# Patient Record
Sex: FEMALE | Race: BLACK OR AFRICAN AMERICAN | NOT HISPANIC OR LATINO
[De-identification: names, ages, dates, MRNs, and addresses within clinical notes are randomized per-mention and may not be internally consistent; named-entity substitution may affect disease eponyms.]

---

## 2019-06-04 ENCOUNTER — RESULT REVIEW (OUTPATIENT)
Age: 54
End: 2019-06-04

## 2019-08-22 ENCOUNTER — RESULT REVIEW (OUTPATIENT)
Age: 54
End: 2019-08-22

## 2020-01-15 ENCOUNTER — INPATIENT (INPATIENT)
Facility: HOSPITAL | Age: 55
LOS: 9 days | Discharge: HOME | End: 2020-01-25
Attending: INTERNAL MEDICINE | Admitting: INTERNAL MEDICINE
Payer: COMMERCIAL

## 2020-01-15 VITALS
OXYGEN SATURATION: 99 % | SYSTOLIC BLOOD PRESSURE: 143 MMHG | TEMPERATURE: 98 F | DIASTOLIC BLOOD PRESSURE: 83 MMHG | HEART RATE: 86 BPM | RESPIRATION RATE: 18 BRPM

## 2020-01-15 DIAGNOSIS — Z98.891 HISTORY OF UTERINE SCAR FROM PREVIOUS SURGERY: Chronic | ICD-10-CM

## 2020-01-15 DIAGNOSIS — R00.2 PALPITATIONS: ICD-10-CM

## 2020-01-15 DIAGNOSIS — I10 ESSENTIAL (PRIMARY) HYPERTENSION: ICD-10-CM

## 2020-01-15 DIAGNOSIS — Z90.3 ACQUIRED ABSENCE OF STOMACH [PART OF]: Chronic | ICD-10-CM

## 2020-01-15 DIAGNOSIS — I46.9 CARDIAC ARREST, CAUSE UNSPECIFIED: ICD-10-CM

## 2020-01-15 DIAGNOSIS — E11.9 TYPE 2 DIABETES MELLITUS WITHOUT COMPLICATIONS: ICD-10-CM

## 2020-01-15 DIAGNOSIS — Z98.51 TUBAL LIGATION STATUS: Chronic | ICD-10-CM

## 2020-01-15 LAB
ALBUMIN SERPL ELPH-MCNC: 4.6 G/DL — SIGNIFICANT CHANGE UP (ref 3.3–5)
ALP SERPL-CCNC: 85 U/L — SIGNIFICANT CHANGE UP (ref 40–120)
ALT FLD-CCNC: 7 U/L — SIGNIFICANT CHANGE UP (ref 4–33)
ANION GAP SERPL CALC-SCNC: 13 MMO/L — SIGNIFICANT CHANGE UP (ref 7–14)
AST SERPL-CCNC: 9 U/L — SIGNIFICANT CHANGE UP (ref 4–32)
BASOPHILS # BLD AUTO: 0.04 K/UL — SIGNIFICANT CHANGE UP (ref 0–0.2)
BASOPHILS NFR BLD AUTO: 0.7 % — SIGNIFICANT CHANGE UP (ref 0–2)
BILIRUB SERPL-MCNC: < 0.2 MG/DL — LOW (ref 0.2–1.2)
BUN SERPL-MCNC: 15 MG/DL — SIGNIFICANT CHANGE UP (ref 7–23)
CALCIUM SERPL-MCNC: 9.9 MG/DL — SIGNIFICANT CHANGE UP (ref 8.4–10.5)
CHLORIDE SERPL-SCNC: 100 MMOL/L — SIGNIFICANT CHANGE UP (ref 98–107)
CK MB BLD-MCNC: 1.59 NG/ML — SIGNIFICANT CHANGE UP (ref 1–4.7)
CK MB BLD-MCNC: SIGNIFICANT CHANGE UP (ref 0–2.5)
CK SERPL-CCNC: 78 U/L — SIGNIFICANT CHANGE UP (ref 25–170)
CO2 SERPL-SCNC: 23 MMOL/L — SIGNIFICANT CHANGE UP (ref 22–31)
CREAT SERPL-MCNC: 0.72 MG/DL — SIGNIFICANT CHANGE UP (ref 0.5–1.3)
EOSINOPHIL # BLD AUTO: 0.04 K/UL — SIGNIFICANT CHANGE UP (ref 0–0.5)
EOSINOPHIL NFR BLD AUTO: 0.7 % — SIGNIFICANT CHANGE UP (ref 0–6)
GLUCOSE BLDC GLUCOMTR-MCNC: 172 MG/DL — HIGH (ref 70–99)
GLUCOSE BLDC GLUCOMTR-MCNC: 80 MG/DL — SIGNIFICANT CHANGE UP (ref 70–99)
GLUCOSE SERPL-MCNC: 91 MG/DL — SIGNIFICANT CHANGE UP (ref 70–99)
HCT VFR BLD CALC: 36 % — SIGNIFICANT CHANGE UP (ref 34.5–45)
HGB BLD-MCNC: 10.6 G/DL — LOW (ref 11.5–15.5)
IMM GRANULOCYTES NFR BLD AUTO: 0.2 % — SIGNIFICANT CHANGE UP (ref 0–1.5)
LYMPHOCYTES # BLD AUTO: 1.59 K/UL — SIGNIFICANT CHANGE UP (ref 1–3.3)
LYMPHOCYTES # BLD AUTO: 29.6 % — SIGNIFICANT CHANGE UP (ref 13–44)
MAGNESIUM SERPL-MCNC: 2 MG/DL — SIGNIFICANT CHANGE UP (ref 1.6–2.6)
MCHC RBC-ENTMCNC: 21.2 PG — LOW (ref 27–34)
MCHC RBC-ENTMCNC: 29.4 % — LOW (ref 32–36)
MCV RBC AUTO: 72.1 FL — LOW (ref 80–100)
MONOCYTES # BLD AUTO: 0.3 K/UL — SIGNIFICANT CHANGE UP (ref 0–0.9)
MONOCYTES NFR BLD AUTO: 5.6 % — SIGNIFICANT CHANGE UP (ref 2–14)
NEUTROPHILS # BLD AUTO: 3.39 K/UL — SIGNIFICANT CHANGE UP (ref 1.8–7.4)
NEUTROPHILS NFR BLD AUTO: 63.2 % — SIGNIFICANT CHANGE UP (ref 43–77)
NRBC # FLD: 0 K/UL — SIGNIFICANT CHANGE UP (ref 0–0)
PHOSPHATE SERPL-MCNC: 3.3 MG/DL — SIGNIFICANT CHANGE UP (ref 2.5–4.5)
PLATELET # BLD AUTO: 416 K/UL — HIGH (ref 150–400)
PMV BLD: 10.3 FL — SIGNIFICANT CHANGE UP (ref 7–13)
POTASSIUM SERPL-MCNC: 4.5 MMOL/L — SIGNIFICANT CHANGE UP (ref 3.5–5.3)
POTASSIUM SERPL-SCNC: 4.5 MMOL/L — SIGNIFICANT CHANGE UP (ref 3.5–5.3)
PROT SERPL-MCNC: 7.6 G/DL — SIGNIFICANT CHANGE UP (ref 6–8.3)
RBC # BLD: 4.99 M/UL — SIGNIFICANT CHANGE UP (ref 3.8–5.2)
RBC # FLD: 16.8 % — HIGH (ref 10.3–14.5)
SODIUM SERPL-SCNC: 136 MMOL/L — SIGNIFICANT CHANGE UP (ref 135–145)
TROPONIN T, HIGH SENSITIVITY: < 6 NG/L — SIGNIFICANT CHANGE UP (ref ?–14)
TSH SERPL-MCNC: 0.74 UIU/ML — SIGNIFICANT CHANGE UP (ref 0.27–4.2)
WBC # BLD: 5.37 K/UL — SIGNIFICANT CHANGE UP (ref 3.8–10.5)
WBC # FLD AUTO: 5.37 K/UL — SIGNIFICANT CHANGE UP (ref 3.8–10.5)

## 2020-01-15 PROCEDURE — 71046 X-RAY EXAM CHEST 2 VIEWS: CPT | Mod: 26

## 2020-01-15 RX ORDER — HEPARIN SODIUM 5000 [USP'U]/ML
5000 INJECTION INTRAVENOUS; SUBCUTANEOUS EVERY 8 HOURS
Refills: 0 | Status: DISCONTINUED | OUTPATIENT
Start: 2020-01-15 | End: 2020-01-24

## 2020-01-15 RX ORDER — DEXTROSE 50 % IN WATER 50 %
25 SYRINGE (ML) INTRAVENOUS ONCE
Refills: 0 | Status: DISCONTINUED | OUTPATIENT
Start: 2020-01-15 | End: 2020-01-25

## 2020-01-15 RX ORDER — INSULIN LISPRO 100/ML
VIAL (ML) SUBCUTANEOUS
Refills: 0 | Status: DISCONTINUED | OUTPATIENT
Start: 2020-01-15 | End: 2020-01-25

## 2020-01-15 RX ORDER — DEXTROSE 50 % IN WATER 50 %
15 SYRINGE (ML) INTRAVENOUS ONCE
Refills: 0 | Status: DISCONTINUED | OUTPATIENT
Start: 2020-01-15 | End: 2020-01-25

## 2020-01-15 RX ORDER — SODIUM CHLORIDE 9 MG/ML
1000 INJECTION, SOLUTION INTRAVENOUS
Refills: 0 | Status: DISCONTINUED | OUTPATIENT
Start: 2020-01-15 | End: 2020-01-25

## 2020-01-15 RX ORDER — DEXTROSE 50 % IN WATER 50 %
12.5 SYRINGE (ML) INTRAVENOUS ONCE
Refills: 0 | Status: DISCONTINUED | OUTPATIENT
Start: 2020-01-15 | End: 2020-01-25

## 2020-01-15 RX ORDER — LISINOPRIL 2.5 MG/1
5 TABLET ORAL DAILY
Refills: 0 | Status: DISCONTINUED | OUTPATIENT
Start: 2020-01-16 | End: 2020-01-25

## 2020-01-15 RX ORDER — GLUCAGON INJECTION, SOLUTION 0.5 MG/.1ML
1 INJECTION, SOLUTION SUBCUTANEOUS ONCE
Refills: 0 | Status: DISCONTINUED | OUTPATIENT
Start: 2020-01-15 | End: 2020-01-25

## 2020-01-15 RX ORDER — METFORMIN HYDROCHLORIDE 850 MG/1
1 TABLET ORAL
Qty: 0 | Refills: 0 | DISCHARGE

## 2020-01-15 RX ORDER — INFLUENZA VIRUS VACCINE 15; 15; 15; 15 UG/.5ML; UG/.5ML; UG/.5ML; UG/.5ML
0.5 SUSPENSION INTRAMUSCULAR ONCE
Refills: 0 | Status: COMPLETED | OUTPATIENT
Start: 2020-01-15 | End: 2020-01-15

## 2020-01-15 RX ORDER — SAXAGLIPTIN 5 MG/1
1 TABLET, FILM COATED ORAL
Qty: 0 | Refills: 0 | DISCHARGE

## 2020-01-15 RX ORDER — LISINOPRIL 2.5 MG/1
1 TABLET ORAL
Qty: 0 | Refills: 0 | DISCHARGE

## 2020-01-15 RX ADMIN — HEPARIN SODIUM 5000 UNIT(S): 5000 INJECTION INTRAVENOUS; SUBCUTANEOUS at 21:24

## 2020-01-15 NOTE — H&P ADULT - ATTENDING COMMENTS
Patient seen and examined.  Agree with above.   Admitted with symptomatic palpitations  Pt. with recent history of ? cardiac arrest in [place] where she underwent cardiac cath that was reportedly normal and TTE which showed normal LVEF with mild inferior hypokinesis  No chest pain or anginal symptoms  No urgent cath needed at this time  Check TTE  Monitor tele  Obtain prior hospital records   EP bob with Dr. Yazmin Dennis MD

## 2020-01-15 NOTE — ED PROVIDER NOTE - OBJECTIVE STATEMENT
55 y/o female hx DM presents to ER for palpitations. Pt. states earlier while at work today felt lightheaded and experiencing palpitations - states flet like she was going to pass out, states she checked her apple watch and her HR was 185 - states eventually resolved. Of note - 1 week ago patient had cardiac arrest in [place] - admitted - had echo and negative cath and had to AMA bc she wanted to come back to [place] - saw her regular doctor a few days ago and is scheduled for outpt cards follow up but hasn't gone yet. Denies chest pain nausea vomit weakness dizziness.

## 2020-01-15 NOTE — H&P ADULT - NSICDXPASTMEDICALHX_GEN_ALL_CORE_FT
PAST MEDICAL HISTORY:  Cardiac arrest 2 week ago, defib 4x, brought to [facility] where she was found to have normal cors    Diabetes
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
## 2020-01-15 NOTE — CONSULT NOTE ADULT - SUBJECTIVE AND OBJECTIVE BOX
HISTORY OF PRESENT ILLNESS: HPI:    54 year old female PMH DM, and 1 week ago while in [place] patient suffered a reported VF arrest, was shocked and resuscitated, hospitalized with reportedly normal Cardiac cath, left AMA and came to [place] for further care, presented today with palpitations while at work sitting at her desk with heart rates recorded on her apple watch up to 185bpm.  NO LOC.  No CP or SOB.  Pt has copies of her records with her  for review.  She has not yet established with a cardiologist in [place].      PAST MEDICAL & SURGICAL HISTORY:  Cardiac arrest  Diabetes    MEDICATIONS  (STANDING):  dextrose 5%. 1000 milliLiter(s) (50 mL/Hr) IV Continuous <Continuous>  dextrose 50% Injectable 12.5 Gram(s) IV Push once  dextrose 50% Injectable 25 Gram(s) IV Push once  dextrose 50% Injectable 25 Gram(s) IV Push once  heparin  Injectable 5000 Unit(s) SubCutaneous every 8 hours  insulin lispro (HumaLOG) corrective regimen sliding scale   SubCutaneous three times a day before meals    Allergies  No Known Allergies    FAMILY HISTORY:  Noncontributory for premature coronary disease or sudden cardiac death    SOCIAL HISTORY:    [x ] Non-smoker  [ ] Smoker  [ ] Alcohol    FLU VACCINE THIS YEAR STARTS IN AUGUST:  [ ] Yes    [x ] No    IF OVER 65 HAVE YOU EVER HAD A PNA VACCINE:  [ ] Yes    [ ] No       [ x] N/A      REVIEW OF SYSTEMS:  [ ]chest pain  [  ]shortness of breath  [ x ]palpitations  [  ]syncope  [ ]near syncope [ ]upper extremity weakness   [ ] lower extremity weakness  [  ]diplopia  [  ]altered mental status   [  ]fevers  [ ]chills [ ]nausea  [ ]vomitting  [  ]dysphagia    [ ]abdominal pain  [ ]melena  [ ]BRBPR    [  ]epistaxis  [  ]rash    [ ]lower extremity edema        [x ] All others negative	  [ ] Unable to obtain      LABS:	 	      CARDIAC MARKERS ( 15 Mp 2020 13:48 )  x     / x     / 78 u/L / 1.59 ng/mL / x        Trop T <6                       10.6   5.37  )-----------( 416      ( 15 Mp 2020 13:48 )             36.0     136  |  100  |  15  ----------------------------<  91  4.5   |  23  |  0.72    Ca    9.9      15 Mp 2020 13:48  Phos  3.3     01-15  Mg     2.0     01-15    TPro  7.6  /  Alb  4.6  /  TBili  < 0.2<L>  /  DBili  x   /  AST  9   /  ALT  7   /  AlkPhos  85  01-15    TSH: Thyroid Stimulating Hormone, Serum: 0.74 uIU/mL (01-15 @ 13:48)    PHYSICAL EXAM:  T(C): 36.8 (01-15-20 @ 16:08), Max: 36.8 (01-15-20 @ 16:08)  HR: 69 (01-15-20 @ 16:08) (69 - 86)  BP: 115/70 (01-15-20 @ 16:08) (115/70 - 143/83)  RR: 18 (01-15-20 @ 16:08) (18 - 18)  SpO2: 99% (01-15-20 @ 16:08) (99% - 99%)    Gen: Appears well in NAD  HEENT:  (-)icterus (-)pallor  CV: N S1 S2 1/6 CORTNEY (+)2 Pulses B/l  Resp:  Clear to auscultation B/L, normal effort  GI: (+) BS Soft, NT, ND  Lymph:  (-)Edema, (-)obvious lymphadenopathy  Skin: Warm to touch, Normal turgor  Psych: Appropriate mood and affect	      ECG:  	NSR, non specific ST-T changes      ASSESSMENT/PLAN: 	54 year old female PMH DM, and 1 week ago while in [place] patient suffered a reported VF arrest, was shocked and resuscitated, hospitalized with reportedly normal Cardiac cath, left AMA and came to [place] for further care, presented today with palpitations while at work sitting at her desk with heart rates recorded on her apple watch up to 185bpm.    --admit to tele  --EP consult Dr Arce called  --review OP records  --check TTE  --further reccs based on above

## 2020-01-15 NOTE — ED PROVIDER NOTE - CLINICAL SUMMARY MEDICAL DECISION MAKING FREE TEXT BOX
53 y/o female hx cardiac arreset c/o palpitations and lightheadedness  -r/o acs, r/o arrythmias  -labs ekg cxr  -admit
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
## 2020-01-15 NOTE — H&P ADULT - OPH GEN HX ROS MEA POS PC
has noticed some cloudiness over left eye for a few seconds at a time for the last 2 days, last Ophth appt last year./blurred vision L
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
## 2020-01-15 NOTE — ED PROVIDER NOTE - CADM POA CENTRAL LINE
[Capillary Refill <2s] : capillary refill < 2s [NL] : warm [FreeTextEntry3] : purulent drainage continuing from the left ear canal  great deal of oozinf  No

## 2020-01-15 NOTE — H&P ADULT - NEGATIVE NEUROLOGICAL SYMPTOMS
no hemiparesis/no paresthesias/no focal seizures/no generalized seizures/no syncope/no weakness/no loss of consciousness/no headache/no confusion/no loss of sensation

## 2020-01-15 NOTE — H&P ADULT - NEGATIVE ENMT SYMPTOMS
no ear pain/no throat pain/no tinnitus/no hearing difficulty/no sinus symptoms/no nasal discharge/no vertigo/no nasal congestion/no dysphagia

## 2020-01-15 NOTE — ED PROVIDER NOTE - ATTENDING CONTRIBUTION TO CARE
53yo F ho DM pw palpitations. 2 weeks ago pt had cardiac arrest likely hypoxic resp failure 2/2 breath holding. pt had vfib arrest shock x 2 and normal cardiac cath after. today presents with palpitaions this morning now resolved but was wearing apple watch that said her heart rate was betw 140s-180s  no chest pain, no sob, no leg swelling  ekg nsr  lungs clear  cv rrr  abd soft nontender  no leg swelling

## 2020-01-15 NOTE — ED ADULT NURSE NOTE - CHPI ED NUR SYMPTOMS NEG
no shortness of breath/no nausea/no back pain/no syncope/no vomiting/no fever/no dizziness/no congestion/no chills/no diaphoresis/no chest pain

## 2020-01-16 LAB
ANION GAP SERPL CALC-SCNC: 14 MMO/L — SIGNIFICANT CHANGE UP (ref 7–14)
BASOPHILS # BLD AUTO: 0.03 K/UL — SIGNIFICANT CHANGE UP (ref 0–0.2)
BASOPHILS NFR BLD AUTO: 0.9 % — SIGNIFICANT CHANGE UP (ref 0–2)
BUN SERPL-MCNC: 14 MG/DL — SIGNIFICANT CHANGE UP (ref 7–23)
CALCIUM SERPL-MCNC: 9.8 MG/DL — SIGNIFICANT CHANGE UP (ref 8.4–10.5)
CHLORIDE SERPL-SCNC: 102 MMOL/L — SIGNIFICANT CHANGE UP (ref 98–107)
CHOLEST SERPL-MCNC: 196 MG/DL — SIGNIFICANT CHANGE UP (ref 120–199)
CO2 SERPL-SCNC: 22 MMOL/L — SIGNIFICANT CHANGE UP (ref 22–31)
CREAT SERPL-MCNC: 0.68 MG/DL — SIGNIFICANT CHANGE UP (ref 0.5–1.3)
EOSINOPHIL # BLD AUTO: 0.05 K/UL — SIGNIFICANT CHANGE UP (ref 0–0.5)
EOSINOPHIL NFR BLD AUTO: 1.5 % — SIGNIFICANT CHANGE UP (ref 0–6)
GLUCOSE BLDC GLUCOMTR-MCNC: 108 MG/DL — HIGH (ref 70–99)
GLUCOSE BLDC GLUCOMTR-MCNC: 120 MG/DL — HIGH (ref 70–99)
GLUCOSE BLDC GLUCOMTR-MCNC: 172 MG/DL — HIGH (ref 70–99)
GLUCOSE BLDC GLUCOMTR-MCNC: 94 MG/DL — SIGNIFICANT CHANGE UP (ref 70–99)
GLUCOSE SERPL-MCNC: 108 MG/DL — HIGH (ref 70–99)
HBA1C BLD-MCNC: 6.5 % — HIGH (ref 4–5.6)
HCT VFR BLD CALC: 36.4 % — SIGNIFICANT CHANGE UP (ref 34.5–45)
HDLC SERPL-MCNC: 44 MG/DL — LOW (ref 45–65)
HGB BLD-MCNC: 10.5 G/DL — LOW (ref 11.5–15.5)
IMM GRANULOCYTES NFR BLD AUTO: 0 % — SIGNIFICANT CHANGE UP (ref 0–1.5)
LIPID PNL WITH DIRECT LDL SERPL: 131 MG/DL — SIGNIFICANT CHANGE UP
LYMPHOCYTES # BLD AUTO: 1.26 K/UL — SIGNIFICANT CHANGE UP (ref 1–3.3)
LYMPHOCYTES # BLD AUTO: 38.2 % — SIGNIFICANT CHANGE UP (ref 13–44)
MAGNESIUM SERPL-MCNC: 1.9 MG/DL — SIGNIFICANT CHANGE UP (ref 1.6–2.6)
MCHC RBC-ENTMCNC: 21.3 PG — LOW (ref 27–34)
MCHC RBC-ENTMCNC: 28.8 % — LOW (ref 32–36)
MCV RBC AUTO: 74 FL — LOW (ref 80–100)
MONOCYTES # BLD AUTO: 0.27 K/UL — SIGNIFICANT CHANGE UP (ref 0–0.9)
MONOCYTES NFR BLD AUTO: 8.2 % — SIGNIFICANT CHANGE UP (ref 2–14)
NEUTROPHILS # BLD AUTO: 1.69 K/UL — LOW (ref 1.8–7.4)
NEUTROPHILS NFR BLD AUTO: 51.2 % — SIGNIFICANT CHANGE UP (ref 43–77)
NRBC # FLD: 0 K/UL — SIGNIFICANT CHANGE UP (ref 0–0)
PLATELET # BLD AUTO: 391 K/UL — SIGNIFICANT CHANGE UP (ref 150–400)
PMV BLD: 10.9 FL — SIGNIFICANT CHANGE UP (ref 7–13)
POTASSIUM SERPL-MCNC: 4.3 MMOL/L — SIGNIFICANT CHANGE UP (ref 3.5–5.3)
POTASSIUM SERPL-SCNC: 4.3 MMOL/L — SIGNIFICANT CHANGE UP (ref 3.5–5.3)
RBC # BLD: 4.92 M/UL — SIGNIFICANT CHANGE UP (ref 3.8–5.2)
RBC # FLD: 16.7 % — HIGH (ref 10.3–14.5)
SODIUM SERPL-SCNC: 138 MMOL/L — SIGNIFICANT CHANGE UP (ref 135–145)
TRIGL SERPL-MCNC: 87 MG/DL — SIGNIFICANT CHANGE UP (ref 10–149)
WBC # BLD: 3.3 K/UL — LOW (ref 3.8–10.5)
WBC # FLD AUTO: 3.3 K/UL — LOW (ref 3.8–10.5)

## 2020-01-16 RX ADMIN — LISINOPRIL 5 MILLIGRAM(S): 2.5 TABLET ORAL at 14:10

## 2020-01-16 RX ADMIN — HEPARIN SODIUM 5000 UNIT(S): 5000 INJECTION INTRAVENOUS; SUBCUTANEOUS at 22:59

## 2020-01-16 RX ADMIN — HEPARIN SODIUM 5000 UNIT(S): 5000 INJECTION INTRAVENOUS; SUBCUTANEOUS at 04:43

## 2020-01-16 RX ADMIN — HEPARIN SODIUM 5000 UNIT(S): 5000 INJECTION INTRAVENOUS; SUBCUTANEOUS at 14:10

## 2020-01-16 NOTE — PROGRESS NOTE ADULT - ATTENDING COMMENTS
Patient seen and examined, agree with above assessment and plan as transcribed above.    - f/u E eval  - ? cardiac MRI  - Obtain records from outside hospital    Felix Grover MD, [facility]  BEEPER [phone]

## 2020-01-16 NOTE — DIETITIAN INITIAL EVALUATION ADULT. - PERTINENT MEDS FT
MEDICATIONS  (STANDING):  dextrose 5%. 1000 milliLiter(s) (50 mL/Hr) IV Continuous <Continuous>  dextrose 50% Injectable 12.5 Gram(s) IV Push once  dextrose 50% Injectable 25 Gram(s) IV Push once  dextrose 50% Injectable 25 Gram(s) IV Push once  heparin  Injectable 5000 Unit(s) SubCutaneous every 8 hours  influenza   Vaccine 0.5 milliLiter(s) IntraMuscular once  insulin lispro (HumaLOG) corrective regimen sliding scale   SubCutaneous three times a day before meals  lisinopril 5 milliGRAM(s) Oral daily

## 2020-01-16 NOTE — DIETITIAN INITIAL EVALUATION ADULT. - OTHER INFO
53 y/o F admitted with palpitations hx of cardiac arrest and DM2 (HbA1c 6.5H). PO intake >75% meals. No GI distress (nausea/vomiting/diarrhea/constipation.) No chewing or swallowing difficulties at this time. NKFA.  pounds; denies any recent weight changes. Heart healthy diet education provided. Low sodium, avoid trans fats, high fiber food choices, lean proteins. Mediterranean diet nutrition therapy print-out provided.
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
## 2020-01-16 NOTE — DIETITIAN INITIAL EVALUATION ADULT. - PERTINENT LABORATORY DATA
01-16 Na 138 mmol/L Glu 108 mg/dL<H> K+ 4.3 mmol/L Cr 0.68 mg/dL BUN 14 mg/dL Phos n/a    01-16-20 HbA1c 6.5 %  01-16 @ 12:35 POCT 94 mg/dL  01-16 @ 08:48 POCT 108 mg/dL  01-15 @ 22:38 POCT 172 mg/dL  01-15 @ 18:23 POCT 80 mg/dL

## 2020-01-16 NOTE — CONSULT NOTE ADULT - SUBJECTIVE AND OBJECTIVE BOX
HISTORY OF PRESENT ILLNESS:  This is a 54 year old female with history of HTN and DM who 1 week ago suffered a Vfib arrest 2019 while on vacation in [place]. Per documentation from [facility], the patient was swimming laps became lightheaded and leaned against the edge of the pool when she became unresponsive.  CPR was initiated and she received 4 shocks by AED. After EMS arrived, she was given 1mg of Epinephrine and 1 additional shock with ROSCA.   TTE at that time noted with overall preserved LV function with possible inferior basal hypokinesis with mild RV enlargement.   Reportedly the patient underwent cardiac cath that showed no obstructive disease although no cath report is available for review.  Further chart notes from [facility] she was found to be in Afib during her hospitalization though there are no EKGs to review.   She was apparently offered a lifevest however she signed out AMA because the lifevest wasn't readily available and she wanted to return home.    She was sent home on ASA and lisinopril.  She now presents with an episode of palpitations and lightheadedness that occurred while she was sitting at work. She noted her apple watch recorded heart rates from 140-180s for about 1 minute.  Her palpitations and dizziness resolved spontaneously. Prior to these events, she denies any history of syncope, near syncope, palpitations or dizziness. She works out 4 times weekly with no issues. She does note her sister passed at age 60 from a heart attack though she reportedly had a history of coronary disease.    She has not yet established with a cardiologist in [place].          PAST MEDICAL & SURGICAL HISTORY:  Hypertension, unspecified type  Cardiac arrest: 2 week ago, defib 4x, brought to [facility] where she was found to have normal cors  Diabetes  History of tubal ligation  History of   History of sleeve gastrectomy          MEDICATIONS:  MEDICATIONS  (STANDING):  dextrose 5%. 1000 milliLiter(s) (50 mL/Hr) IV Continuous <Continuous>  dextrose 50% Injectable 12.5 Gram(s) IV Push once  dextrose 50% Injectable 25 Gram(s) IV Push once  dextrose 50% Injectable 25 Gram(s) IV Push once  heparin  Injectable 5000 Unit(s) SubCutaneous every 8 hours  influenza   Vaccine 0.5 milliLiter(s) IntraMuscular once  insulin lispro (HumaLOG) corrective regimen sliding scale   SubCutaneous three times a day before meals  lisinopril 5 milliGRAM(s) Oral daily      Allergies    No Known Allergies    Intolerances        FAMILY HISTORY:    Non-contributary for premature coronary disease or sudden cardiac death    SOCIAL HISTORY:    x[ ] Non-smoker  [ ] Smoker  [x ] Alcohol - no ETOH      REVIEW OF SYSTEMS:  [ ]chest pain  [  ]shortness of breath  [ x ]palpitations  [  ]syncope  [ ]near syncope [ ]upper extremity weakness   [ ] lower extremity weakness  [  ]diplopia  [  ]altered mental status [x] lightheadedness  [  ]fevers  [ ]chills [ ]nausea  [ ]vomitting  [  ]dysphagia    [ ]abdominal pain  [ ]melena  [ ]BRBPR    [  ]epistaxis  [  ]rash    [ ]lower extremity edema        [x ] All others negative	  [ ] Unable to obtain      LABS:	 	    CARDIAC MARKERS:  CARDIAC MARKERS ( 15 Mp 2020 13:48 )  x     / x     / 78 u/L / 1.59 ng/mL / x                                  10.5   3.30  )-----------( 391      ( 2020 06:55 )             36.4     Hb Trend: 10.5<--        138  |  102  |  14  ----------------------------<  108<H>  4.3   |  22  |  0.68    Ca    9.8      2020 06:55  Phos  3.3     01-15  Mg     1.9         TPro  7.6  /  Alb  4.6  /  TBili  < 0.2<L>  /  DBili  x   /  AST  9   /  ALT  7   /  AlkPhos  85  01-15    Creatinine Trend: 0.68<--, 0.72<--    Coags:      proBNP:   Lipid Profile:   HgA1c: Hemoglobin A1C, Whole Blood: 6.5 % ( @ 06:55)    TSH: Thyroid Stimulating Hormone, Serum: 0.74 uIU/mL (01.15.20 @ 13:48)            PHYSICAL EXAM:  T(C): 36.7 (20 @ 13:14), Max: 36.8 (01-15-20 @ 18:38)  HR: 63 (20 @ 13:14) (63 - 72)  BP: 118/80 (20 @ 14:09) (100/67 - 125/78)  RR: 18 (20 @ 13:14) (17 - 18)  SpO2: 100% (-16-20 @ 13:14) (97% - 100%)  Wt(kg): --  I&O's Summary      Gen: Appears well in NAD  HEENT:  (-)icterus (-)pallor  CV: N S1 S2 1/6 CORTNEY (+)2 Pulses B/l  Resp:  Clear to ausculatation B/L, normal effort  GI: (+) BS Soft, NT, ND  Lymph:  (-)Edema, (-)obvious lymphadenopathy  Skin: Warm to touch, Normal turgor  Psych: Appropriate mood and affect        TELEMETRY: 	 NSR 60s     ECG:  	NSR narrow QRS QT wNL    RADIOLOGY:      CXR normal   TTE: pending        ASSESSMENT/PLAN: 	  This is a 54 year old female with history of HTN and DM who 1 week ago suffered a Vfib arrest 2019 while on vacation in [place]. Per documentation from [facility], the patient was swimming laps became lightheaded and leaned against the edge of the pool when she became unresponsive.  CPR was initiated and she received 4 shocks by AED. After EMS arrived, she was given 1mg of Epinephrine and 1 additional shock with ROSCA.   TTE at that time noted with overall preserved LV function with possible inferior basal hypokinesis with mild RV enlargement.   Reportedly the patient underwent cardiac cath that showed no obstructive disease although no cath report is available for review.  Further chart notes from [facility] she was found to be in Afib during her hospitalization though there are no EKGs to review.   She was apparently offered a lifevest however she signed out AMA because the lifevest wasn't readily available and she wanted to return home.    She was sent home on ASA and lisinopril.  She now presents with an episode of palpitations and lightheadedness that occurred while she was sitting at work. She noted her apple watch recorded heart rates from 140-180s for about 1 minute.  Her palpitations and dizziness resolved spontaneously. Prior to these events, she denies any history of syncope, near syncope, palpitations or dizziness. She works out 4 times weekly with no issues. She does note her sister passed at age 60 from a heart attack though she reportedly had a history of coronary disease.    She has not yet established with a cardiologist in [place].      -- follow up TTE  -- continue on tele; EKG with narrow QRS, QT appears to be WNL  -- TSH WNL  -- given spontaneous Vfib arrest will d/w attending regarding need for ICD  -- ?PAF  documented by [facility] but no EKG available for review from that hospitalization; thus far patient has been in NSR             - if she did have PAF - her CHADSVASC2 would be 3 (female, HTN, DM) and she would likely benefit from AC               - will attempt to obtain further records  -- final recs pending above HISTORY OF PRESENT ILLNESS:  This is a 54 year old female with history of HTN and DM who 1 week ago suffered a Vfib arrest 2019 while on vacation in [place]. Per documentation from [facility], the patient was swimming laps became lightheaded and leaned against the edge of the pool when she became unresponsive.  CPR was initiated and she received 4 shocks by AED. After EMS arrived, she was given 1mg of Epinephrine and 1 additional shock with ROSCA.   TTE at that time noted with overall preserved LV function with possible inferior basal hypokinesis with mild RV enlargement.   Reportedly the patient underwent cardiac cath that showed no obstructive disease although no cath report is available for review.  Further chart notes from [facility] she was found to be in Afib during her hospitalization though there are no EKGs to review. She was apparently offered a lifevest however she signed out AMA because the lifevest wasn't readily available and she wanted to return home. She was sent home on ASA and lisinopril.  She now presents with an episode of palpitations and lightheadedness that occurred while she was sitting at work. She noted her apple watch recorded heart rates from 140-180s for about 1 minute.  Her palpitations and dizziness resolved spontaneously. Prior to these events, she denies any history of syncope, near syncope, palpitations or dizziness. She works out 4 times weekly with no issues. She does note her sister passed at age 60 from a heart attack though she reportedly had a history of coronary disease.  She has not yet established with a cardiologist in [place].      PAST MEDICAL & SURGICAL HISTORY:  Hypertension, unspecified type  Cardiac arrest: 2 week ago, defib 4x, brought to [facility] where she was found to have normal cors  Diabetes  History of tubal ligation  History of   History of sleeve gastrectomy          MEDICATIONS:  MEDICATIONS  (STANDING):  dextrose 5%. 1000 milliLiter(s) (50 mL/Hr) IV Continuous <Continuous>  dextrose 50% Injectable 12.5 Gram(s) IV Push once  dextrose 50% Injectable 25 Gram(s) IV Push once  dextrose 50% Injectable 25 Gram(s) IV Push once  heparin  Injectable 5000 Unit(s) SubCutaneous every 8 hours  influenza   Vaccine 0.5 milliLiter(s) IntraMuscular once  insulin lispro (HumaLOG) corrective regimen sliding scale   SubCutaneous three times a day before meals  lisinopril 5 milliGRAM(s) Oral daily      Allergies    No Known Allergies    Intolerances        FAMILY HISTORY:    Non-contributary for premature coronary disease or sudden cardiac death    SOCIAL HISTORY:    x[ ] Non-smoker  [ ] Smoker  [x ] Alcohol - no ETOH      REVIEW OF SYSTEMS:  [ ]chest pain  [  ]shortness of breath  [ x ]palpitations  [  ]syncope  [ ]near syncope [ ]upper extremity weakness   [ ] lower extremity weakness  [  ]diplopia  [  ]altered mental status [x] lightheadedness  [  ]fevers  [ ]chills [ ]nausea  [ ]vomitting  [  ]dysphagia    [ ]abdominal pain  [ ]melena  [ ]BRBPR    [  ]epistaxis  [  ]rash    [ ]lower extremity edema        [x ] All others negative	  [ ] Unable to obtain      LABS:	 	    CARDIAC MARKERS:  CARDIAC MARKERS ( 15 Mp 2020 13:48 )  x     / x     / 78 u/L / 1.59 ng/mL / x                                  10.5   3.30  )-----------( 391      ( 2020 06:55 )             36.4     Hb Trend: 10.5<--        138  |  102  |  14  ----------------------------<  108<H>  4.3   |  22  |  0.68    Ca    9.8      2020 06:55  Phos  3.3     01-15  Mg     1.9         TPro  7.6  /  Alb  4.6  /  TBili  < 0.2<L>  /  DBili  x   /  AST  9   /  ALT  7   /  AlkPhos  85  01-15    Creatinine Trend: 0.68<--, 0.72<--    Coags:      proBNP:   Lipid Profile:   HgA1c: Hemoglobin A1C, Whole Blood: 6.5 % ( @ 06:55)    TSH: Thyroid Stimulating Hormone, Serum: 0.74 uIU/mL (01.15.20 @ 13:48)            PHYSICAL EXAM:  T(C): 36.7 (20 @ 13:14), Max: 36.8 (01-15-20 @ 18:38)  HR: 63 (20 @ 13:14) (63 - 72)  BP: 118/80 (20 @ 14:09) (100/67 - 125/78)  RR: 18 (20 @ 13:14) (17 - 18)  SpO2: 100% (-16-20 @ 13:14) (97% - 100%)  Wt(kg): --  I&O's Summary      Gen: Appears well in NAD  HEENT:  (-)icterus (-)pallor  CV: N S1 S2 1/6 CORTNEY (+)2 Pulses B/l  Resp:  Clear to ausculatation B/L, normal effort  GI: (+) BS Soft, NT, ND  Lymph:  (-)Edema, (-)obvious lymphadenopathy  Skin: Warm to touch, Normal turgor  Psych: Appropriate mood and affect        TELEMETRY: 	 NSR 60s     ECG:  	NSR narrow QRS QT wNL    RADIOLOGY:      CXR normal   TTE: pending        ASSESSMENT/PLAN: 	  This is a 54 year old female with history of HTN and DM who 1 week ago suffered a Vfib arrest 2019 while on vacation in [place]. Per documentation from [facility], the patient was swimming laps became lightheaded and leaned against the edge of the pool when she became unresponsive.  CPR was initiated and she received 4 shocks by AED. After EMS arrived, she was given 1mg of Epinephrine and 1 additional shock with ROSCA.   TTE at that time noted with overall preserved LV function with possible inferior basal hypokinesis with mild RV enlargement.   Reportedly the patient underwent cardiac cath that showed no obstructive disease although no cath report is available for review.  Further chart notes from [facility] she was found to be in Afib during her hospitalization though there are no EKGs to review. She was apparently offered a lifevest however she signed out AMA because the lifevest wasn't readily available and she wanted to return home.   She was sent home on ASA and lisinopril.  She now presents with an episode of palpitations and lightheadedness that occurred while she was sitting at work. She noted her apple watch recorded heart rates from 140-180s for about 1 minute.  Her palpitations and dizziness resolved spontaneously. Prior to these events, she denies any history of syncope, near syncope, palpitations or dizziness. She works out 4 times weekly with no issues. She does note her sister passed at age 60 from a heart attack though she reportedly had a history of coronary disease. She has not yet established with a cardiologist in [place].      -- follow up TTE  -- continue on tele; EKG with narrow QRS, QT appears to be WNL  -- TSH WNL  -- given spontaneous Vfib arrest will d/w attending regarding need for ICD  -- ?PAF  documented by [facility] but no EKG available for review from that hospitalization; thus far patient has been in NSR             - if she did have PAF - her CHADSVASC2 would be 3 (female, HTN, DM) and she would likely benefit from AC               - will attempt to obtain further records  -- final recs pending above

## 2020-01-16 NOTE — PROGRESS NOTE ADULT - SUBJECTIVE AND OBJECTIVE BOX
Subjective: No chest pain or sob; ROS otherwise negative.   	  MEDICATIONS:  MEDICATIONS  (STANDING):  dextrose 5%. 1000 milliLiter(s) (50 mL/Hr) IV Continuous <Continuous>  dextrose 50% Injectable 12.5 Gram(s) IV Push once  dextrose 50% Injectable 25 Gram(s) IV Push once  dextrose 50% Injectable 25 Gram(s) IV Push once  heparin  Injectable 5000 Unit(s) SubCutaneous every 8 hours  influenza   Vaccine 0.5 milliLiter(s) IntraMuscular once  insulin lispro (HumaLOG) corrective regimen sliding scale   SubCutaneous three times a day before meals  lisinopril 5 milliGRAM(s) Oral daily      LABS:	 	    CARDIAC MARKERS:  CARDIAC MARKERS ( 15 Mp 2020 13:48 )  x     / x     / 78 u/L / 1.59 ng/mL / x                                    10.5   3.30  )-----------( 391      ( 16 Jan 2020 06:55 )             36.4     01-16    138  |  102  |  14  ----------------------------<  108<H>  4.3   |  22  |  0.68    Ca    9.8      16 Jan 2020 06:55  Phos  3.3     01-15  Mg     1.9     01-16    TPro  7.6  /  Alb  4.6  /  TBili  < 0.2<L>  /  DBili  x   /  AST  9   /  ALT  7   /  AlkPhos  85  01-15    proBNP:   Lipid Profile:   HgA1c: Hemoglobin A1C, Whole Blood: 6.5 % (01-16 @ 06:55)    TSH: Thyroid Stimulating Hormone, Serum: 0.74 uIU/mL (01-15 @ 13:48)        PHYSICAL EXAM:  T(C): 36.7 (01-16-20 @ 04:38), Max: 36.8 (01-15-20 @ 16:08)  HR: 71 (01-16-20 @ 04:38) (65 - 72)  BP: 100/67 (01-16-20 @ 04:38) (100/67 - 125/78)  RR: 18 (01-16-20 @ 04:38) (17 - 18)  SpO2: 100% (01-16-20 @ 04:38) (97% - 100%)  Wt(kg): --  I&O's Summary    Height (cm): 170.18 (01-15 @ 17:43)  Weight (kg): 108.9 (01-15 @ 17:43)  BMI (kg/m2): 37.6 (01-15 @ 17:43)  BSA (m2): 2.19 (01-15 @ 17:43)    Appearance: Normal	  HEENT:   Normal oral mucosa, PERRL, EOMI	  Lymphatic: No lymphadenopathy , no edema  Cardiovascular: Normal S1 S2, No JVD, No murmurs , Peripheral pulses palpable 2+ bilaterally  Respiratory: Lungs clear to auscultation, normal effort 	  Gastrointestinal:  Soft, Non-tender, + BS	  Skin: No rashes, No ecchymoses, No cyanosis, warm to touch  Musculoskeletal: Normal range of motion, normal strength  Psychiatry:  Mood & affect appropriate    TELEMETRY: SR	    ECG:  	  RADIOLOGY:   DIAGNOSTIC TESTING:  [ ] Echocardiogram:  [ ]  Catheterization:  [ ] Stress Test:    OTHER: 	      ASSESSMENT/PLAN: 	54y Female with history of DM, history of cardiac arrest this month in [place] admitted with palpitations.     -Monitor on tele to rule out malignant arrythmias  -Pt. reports normal cath in [place] this month - obtain records  -Check TTE  -EP consult with Dr. Arce  -Further interventional workup pending above    Allison Dennis MD

## 2020-01-16 NOTE — PROGRESS NOTE ADULT - SUBJECTIVE AND OBJECTIVE BOX
Patient denies chest pain or shortness of breath.   Review of systems otherwise (-)  	  MEDICATIONS  (STANDING):  dextrose 5%. 1000 milliLiter(s) (50 mL/Hr) IV Continuous <Continuous>  dextrose 50% Injectable 12.5 Gram(s) IV Push once  dextrose 50% Injectable 25 Gram(s) IV Push once  dextrose 50% Injectable 25 Gram(s) IV Push once  heparin  Injectable 5000 Unit(s) SubCutaneous every 8 hours  influenza   Vaccine 0.5 milliLiter(s) IntraMuscular once  insulin lispro (HumaLOG) corrective regimen sliding scale   SubCutaneous three times a day before meals  lisinopril 5 milliGRAM(s) Oral daily    LABS:	 	                      10.5   3.30  )-----------( 391      ( 16 Jan 2020 06:55 )             36.4     138  |  102  |  14  ----------------------------<  108<H>  4.3   |  22  |  0.68    Ca    9.8      16 Jan 2020 06:55  Phos  3.3     01-15  Mg     1.9     01-16    TPro  7.6  /  Alb  4.6  /  TBili  < 0.2<L>  /  DBili  x   /  AST  9   /  ALT  7   /  AlkPhos  85  01-15    Creatinine Trend: 0.68<--, 0.72<--    HgA1c: Hemoglobin A1C, Whole Blood: 6.5 % (01-16 @ 06:55)    TSH: Thyroid Stimulating Hormone, Serum: 0.74 uIU/mL (01-15 @ 13:48)      PHYSICAL EXAM:  T(C): 36.7 (01-16-20 @ 04:38), Max: 36.8 (01-15-20 @ 16:08)  HR: 71 (01-16-20 @ 04:38) (65 - 86)  BP: 100/67 (01-16-20 @ 04:38) (100/67 - 143/83)  RR: 18 (01-16-20 @ 04:38) (17 - 18)  SpO2: 100% (01-16-20 @ 04:38) (97% - 100%)    Gen: Appears well in NAD  HEENT:  (-)icterus (-)pallor  CV: N S1 S2 1/6 CORTNEY (+)2 Pulses B/l  Resp:  Clear to ausculatation B/L, normal effort  GI: (+) BS Soft, NT, ND  Lymph:  (-)Edema, (-)obvious lymphadenopathy  Skin: Warm to touch, Normal turgor  Psych: Appropriate mood and affect    TELEMETRY: 	SR      Echo pending    ASSESSMENT/PLAN: 	  54 year old female PMH DM, and 1 week ago while in [place] patient suffered a reported VF arrest, was shocked and resuscitated, hospitalized with reportedly normal Cardiac cath, left AMA and came to [place] for further care, presented today with palpitations while at work sitting at her desk with heart rates recorded on her apple watch up to 185bpm.    --no events overnight  --EP consult Dr Arce called for hx VF arrest, borderlin QTc  --review OP records  --check TTE  --further reccs based on above

## 2020-01-17 LAB
ANION GAP SERPL CALC-SCNC: 13 MMO/L — SIGNIFICANT CHANGE UP (ref 7–14)
BUN SERPL-MCNC: 14 MG/DL — SIGNIFICANT CHANGE UP (ref 7–23)
CALCIUM SERPL-MCNC: 9.9 MG/DL — SIGNIFICANT CHANGE UP (ref 8.4–10.5)
CHLORIDE SERPL-SCNC: 103 MMOL/L — SIGNIFICANT CHANGE UP (ref 98–107)
CO2 SERPL-SCNC: 21 MMOL/L — LOW (ref 22–31)
CREAT SERPL-MCNC: 0.69 MG/DL — SIGNIFICANT CHANGE UP (ref 0.5–1.3)
GLUCOSE BLDC GLUCOMTR-MCNC: 103 MG/DL — HIGH (ref 70–99)
GLUCOSE BLDC GLUCOMTR-MCNC: 110 MG/DL — HIGH (ref 70–99)
GLUCOSE BLDC GLUCOMTR-MCNC: 151 MG/DL — HIGH (ref 70–99)
GLUCOSE BLDC GLUCOMTR-MCNC: 99 MG/DL — SIGNIFICANT CHANGE UP (ref 70–99)
GLUCOSE SERPL-MCNC: 126 MG/DL — HIGH (ref 70–99)
HCT VFR BLD CALC: 36.4 % — SIGNIFICANT CHANGE UP (ref 34.5–45)
HGB BLD-MCNC: 10.5 G/DL — LOW (ref 11.5–15.5)
MCHC RBC-ENTMCNC: 20.9 PG — LOW (ref 27–34)
MCHC RBC-ENTMCNC: 28.8 % — LOW (ref 32–36)
MCV RBC AUTO: 72.5 FL — LOW (ref 80–100)
NRBC # FLD: 0 K/UL — SIGNIFICANT CHANGE UP (ref 0–0)
PLATELET # BLD AUTO: 339 K/UL — SIGNIFICANT CHANGE UP (ref 150–400)
PMV BLD: 11.2 FL — SIGNIFICANT CHANGE UP (ref 7–13)
POTASSIUM SERPL-MCNC: 4.3 MMOL/L — SIGNIFICANT CHANGE UP (ref 3.5–5.3)
POTASSIUM SERPL-SCNC: 4.3 MMOL/L — SIGNIFICANT CHANGE UP (ref 3.5–5.3)
RBC # BLD: 5.02 M/UL — SIGNIFICANT CHANGE UP (ref 3.8–5.2)
RBC # FLD: 16.7 % — HIGH (ref 10.3–14.5)
SODIUM SERPL-SCNC: 137 MMOL/L — SIGNIFICANT CHANGE UP (ref 135–145)
WBC # BLD: 3.23 K/UL — LOW (ref 3.8–10.5)
WBC # FLD AUTO: 3.23 K/UL — LOW (ref 3.8–10.5)

## 2020-01-17 RX ADMIN — HEPARIN SODIUM 5000 UNIT(S): 5000 INJECTION INTRAVENOUS; SUBCUTANEOUS at 22:16

## 2020-01-17 RX ADMIN — LISINOPRIL 5 MILLIGRAM(S): 2.5 TABLET ORAL at 05:42

## 2020-01-17 RX ADMIN — HEPARIN SODIUM 5000 UNIT(S): 5000 INJECTION INTRAVENOUS; SUBCUTANEOUS at 05:42

## 2020-01-17 RX ADMIN — HEPARIN SODIUM 5000 UNIT(S): 5000 INJECTION INTRAVENOUS; SUBCUTANEOUS at 14:07

## 2020-01-17 NOTE — PROGRESS NOTE ADULT - SUBJECTIVE AND OBJECTIVE BOX
Subjective: No chest pain or sob; ROS otherwise negative.   	    dextrose 40% Gel 15 Gram(s) Oral once PRN  dextrose 5%. 1000 milliLiter(s) IV Continuous <Continuous>  dextrose 50% Injectable 12.5 Gram(s) IV Push once  dextrose 50% Injectable 25 Gram(s) IV Push once  dextrose 50% Injectable 25 Gram(s) IV Push once  glucagon  Injectable 1 milliGRAM(s) IntraMuscular once PRN  heparin  Injectable 5000 Unit(s) SubCutaneous every 8 hours  insulin lispro (HumaLOG) corrective regimen sliding scale   SubCutaneous three times a day before meals  lisinopril 5 milliGRAM(s) Oral daily                            10.5   3.23  )-----------( 339      ( 17 Jan 2020 06:00 )             36.4       01-17    137  |  103  |  14  ----------------------------<  126<H>  4.3   |  21<L>  |  0.69    Ca    9.9      17 Jan 2020 06:00  Mg     1.9     01-16        CARDIAC MARKERS ( 15 Mp 2020 13:48 )  x     / x     / 78 u/L / 1.59 ng/mL / x            T(C): 36.3 (01-17-20 @ 12:53), Max: 36.7 (01-16-20 @ 22:51)  HR: 67 (01-17-20 @ 12:53) (67 - 78)  BP: 119/73 (01-17-20 @ 12:53) (107/66 - 119/73)  RR: 18 (01-17-20 @ 12:53) (18 - 18)  SpO2: 100% (01-17-20 @ 12:53) (100% - 100%)  Wt(kg): --    I&O's Summary    Appearance: Normal	  HEENT:   Normal oral mucosa, PERRL, EOMI	  Lymphatic: No lymphadenopathy , no edema  Cardiovascular: Normal S1 S2, No JVD, No murmurs , Peripheral pulses palpable 2+ bilaterally  Respiratory: Lungs clear to auscultation, normal effort 	  Gastrointestinal:  Soft, Non-tender, + BS	  Skin: No rashes, No ecchymoses, No cyanosis, warm to touch  Musculoskeletal: Normal range of motion, normal strength  Psychiatry:  Mood & affect appropriate    TELEMETRY: SR	    ECG:  	  RADIOLOGY:   DIAGNOSTIC TESTING:  [ ] Echocardiogram:  [ ]  Catheterization:  [ ] Stress Test:    OTHER: 	      ASSESSMENT/PLAN: 	54y Female with history of DM, history of cardiac arrest this month in [place] admitted with palpitations.     -Monitor on tele to rule out malignant arrythmias  -Awaiting cath reports from [facility]  -EP consult with Dr. Arce appreciated  -Check cardiac MRI  -Further interventional workup pending above    Allison Dennis MD

## 2020-01-17 NOTE — PROGRESS NOTE ADULT - PROBLEM SELECTOR PLAN 1
HbA1C 6.5  acceptable  will continue to monitor as outpatient  continue finger sticks with short acting insulin sliding scale

## 2020-01-17 NOTE — PROGRESS NOTE ADULT - SUBJECTIVE AND OBJECTIVE BOX
Patient denies chest pain or shortness of breath.   Review of systems otherwise (-)        dextrose 40% Gel 15 Gram(s) Oral once PRN  dextrose 5%. 1000 milliLiter(s) IV Continuous <Continuous>  dextrose 50% Injectable 12.5 Gram(s) IV Push once  dextrose 50% Injectable 25 Gram(s) IV Push once  dextrose 50% Injectable 25 Gram(s) IV Push once  glucagon  Injectable 1 milliGRAM(s) IntraMuscular once PRN  heparin  Injectable 5000 Unit(s) SubCutaneous every 8 hours  insulin lispro (HumaLOG) corrective regimen sliding scale   SubCutaneous three times a day before meals  lisinopril 5 milliGRAM(s) Oral daily                          10.5   3.23  )-----------( 339      ( 17 Jan 2020 06:00 )             36.4       Hemoglobin: 10.5 g/dL (01-17 @ 06:00)  Hemoglobin: 10.5 g/dL (01-16 @ 06:55)  Hemoglobin: 10.6 g/dL (01-15 @ 13:48)      01-17    137  |  103  |  14  ----------------------------<  126<H>  4.3   |  21<L>  |  0.69    Ca    9.9      17 Jan 2020 06:00  Mg     1.9     01-16      Creatinine Trend: 0.69<--, 0.68<--, 0.72<--    COAGS:     CARDIAC MARKERS ( 15 Mp 2020 13:48 )  x     / x     / 78 u/L / 1.59 ng/mL / x          PHYSICAL EXAM:  Vital Signs last 24 Hours   T(C): 36.3 (01-17-20 @ 12:53), Max: 36.7 (01-16-20 @ 22:51)  HR: 67 (01-17-20 @ 12:53) (67 - 78)  BP: 119/73 (01-17-20 @ 12:53) (107/66 - 119/73)  RR: 18 (01-17-20 @ 12:53) (18 - 18)  SpO2: 100% (01-17-20 @ 12:53) (100% - 100%)  Wt(kg): --    I&O's Summary  	    Gen: Appears well in NAD  HEENT:  (-)icterus (-)pallor  CV: N S1 S2 1/6 CORTNEY (+)2 Pulses B/l  Resp:  Clear to auscultation B/L, normal effort  GI: (+) BS Soft, NT, ND  Lymph:  (-)Edema, (-)obvious lymphadenopathy  Skin: Warm to touch, Normal turgor  Psych: Appropriate mood and affect    TELEMETRY: 	SR, no events     DIAGNOSTICS:    < from: Transthoracic Echocardiogram (01.16.20 @ 16:35) >  CONCLUSIONS:  1. Normal mitral valve. Minimal mitral regurgitation.  2. Normal left ventricular internal dimensions and wall  thicknesses.  3. Endocardium not well visualized; grossly normal left  ventricular systolic function.  4. The right ventricle is not well visualized; grossly  normal right ventricular systolic function.  ------------------------------------------------------------------------  Confirmed on  1/16/2020 - 17:54:57 by Lawrence eNss M.D.        ASSESSMENT/PLAN: 	    54 year old female PMH DM, and 1 week ago while in [place] patient suffered a reported VF arrest, was shocked and resuscitated, hospitalized with reportedly normal Cardiac cath, left AMA and came to [place] for further care, presented today with palpitations while at work sitting at her desk with heart rates recorded on her apple watch up to 185bpm.    --no events overnight  --pt without cp, or anginal symptoms   --echo as noted  --f/u cardiac MRI eval structural heart - pending   --EP consult Dr Arce called for hx VF arrest, borderlin QTc, reccs noted   --awaiting review OP records   --further reccs based on above

## 2020-01-17 NOTE — PROGRESS NOTE ADULT - SUBJECTIVE AND OBJECTIVE BOX
Patient is a 54y old  Female who presents with a chief complaint of dizziness and palpitations (16 Jan 2020 13:43)      SUBJECTIVE / OVERNIGHT EVENTS: overnight events noted    ROS:  Resp: No cough no sputum production  CVS: No chest pain no palpitations no orthopnea  GI: no N/V/D  : no dysuria, no hematuria  Neuro: no weakness no paresthesias  Heme: No petechiae no easy bruising  Msk: No joint pain no swelling  Skin: No rash no itching        MEDICATIONS  (STANDING):  dextrose 5%. 1000 milliLiter(s) (50 mL/Hr) IV Continuous <Continuous>  dextrose 50% Injectable 12.5 Gram(s) IV Push once  dextrose 50% Injectable 25 Gram(s) IV Push once  dextrose 50% Injectable 25 Gram(s) IV Push once  heparin  Injectable 5000 Unit(s) SubCutaneous every 8 hours  insulin lispro (HumaLOG) corrective regimen sliding scale   SubCutaneous three times a day before meals  lisinopril 5 milliGRAM(s) Oral daily    MEDICATIONS  (PRN):  dextrose 40% Gel 15 Gram(s) Oral once PRN Blood Glucose LESS THAN 70 milliGRAM(s)/deciliter  glucagon  Injectable 1 milliGRAM(s) IntraMuscular once PRN Glucose LESS THAN 70 milligrams/deciliter        CAPILLARY BLOOD GLUCOSE      POCT Blood Glucose.: 110 mg/dL (17 Jan 2020 08:47)  POCT Blood Glucose.: 172 mg/dL (16 Jan 2020 21:44)  POCT Blood Glucose.: 120 mg/dL (16 Jan 2020 18:03)  POCT Blood Glucose.: 94 mg/dL (16 Jan 2020 12:35)    I&O's Summary      Vital Signs Last 24 Hrs  T(C): 36.3 (17 Jan 2020 05:40), Max: 36.7 (16 Jan 2020 13:14)  T(F): 97.4 (17 Jan 2020 05:40), Max: 98 (16 Jan 2020 13:14)  HR: 78 (17 Jan 2020 05:40) (63 - 78)  BP: 114/75 (17 Jan 2020 05:40) (107/66 - 122/80)  BP(mean): --  RR: 18 (17 Jan 2020 05:40) (18 - 18)  SpO2: 100% (17 Jan 2020 05:40) (100% - 100%)    PHYSICAL EXAM:  GENERAL: NAD, well-developed  HEAD:  Atraumatic, Normocephalic  EYES: EOMI, PERRLA, conjunctiva and sclera clear  NECK: Supple, No JVD  CHEST/LUNG: Clear to auscultation bilaterally; No wheeze  HEART: S1S2; No rubs, or gallops, no murmurs  ABDOMEN: Soft, Nontender, Nondistended; Bowel sounds present  EXTREMITIES:  + Peripheral Pulses, No clubbing or cyanosis, no edema  PSYCH: AO x 3,   NEUROLOGY: Alert, no focal motor or sensory deficits  SKIN: No rashes or lesions    LABS:                        10.5   3.23  )-----------( 339      ( 17 Jan 2020 06:00 )             36.4     01-17    137  |  103  |  14  ----------------------------<  126<H>  4.3   |  21<L>  |  0.69    Ca    9.9      17 Jan 2020 06:00  Phos  3.3     01-15  Mg     1.9     01-16    TPro  7.6  /  Alb  4.6  /  TBili  < 0.2<L>  /  DBili  x   /  AST  9   /  ALT  7   /  AlkPhos  85  01-15      CARDIAC MARKERS ( 15 Mp 2020 13:48 )  x     / x     / 78 u/L / 1.59 ng/mL / x                All consultant(s) notes reviewed and care discussed with other providers        Contact Number, Dr Hsieh [phone]

## 2020-01-17 NOTE — PROGRESS NOTE ADULT - PROBLEM SELECTOR PLAN 4
agree with EP evaluation if cardiology attending feels needed  continue Tele monitoring  MRI pending to evaluate dizziness

## 2020-01-18 LAB
ANION GAP SERPL CALC-SCNC: 8 MMO/L — SIGNIFICANT CHANGE UP (ref 7–14)
BUN SERPL-MCNC: 15 MG/DL — SIGNIFICANT CHANGE UP (ref 7–23)
CALCIUM SERPL-MCNC: 9.4 MG/DL — SIGNIFICANT CHANGE UP (ref 8.4–10.5)
CHLORIDE SERPL-SCNC: 105 MMOL/L — SIGNIFICANT CHANGE UP (ref 98–107)
CO2 SERPL-SCNC: 25 MMOL/L — SIGNIFICANT CHANGE UP (ref 22–31)
CREAT SERPL-MCNC: 0.69 MG/DL — SIGNIFICANT CHANGE UP (ref 0.5–1.3)
GLUCOSE BLDC GLUCOMTR-MCNC: 142 MG/DL — HIGH (ref 70–99)
GLUCOSE BLDC GLUCOMTR-MCNC: 145 MG/DL — HIGH (ref 70–99)
GLUCOSE BLDC GLUCOMTR-MCNC: 168 MG/DL — HIGH (ref 70–99)
GLUCOSE BLDC GLUCOMTR-MCNC: 93 MG/DL — SIGNIFICANT CHANGE UP (ref 70–99)
GLUCOSE SERPL-MCNC: 132 MG/DL — HIGH (ref 70–99)
HCT VFR BLD CALC: 36.5 % — SIGNIFICANT CHANGE UP (ref 34.5–45)
HGB BLD-MCNC: 10.7 G/DL — LOW (ref 11.5–15.5)
MAGNESIUM SERPL-MCNC: 2 MG/DL — SIGNIFICANT CHANGE UP (ref 1.6–2.6)
MCHC RBC-ENTMCNC: 21.3 PG — LOW (ref 27–34)
MCHC RBC-ENTMCNC: 29.3 % — LOW (ref 32–36)
MCV RBC AUTO: 72.6 FL — LOW (ref 80–100)
NRBC # FLD: 0 K/UL — SIGNIFICANT CHANGE UP (ref 0–0)
PHOSPHATE SERPL-MCNC: 3.6 MG/DL — SIGNIFICANT CHANGE UP (ref 2.5–4.5)
PLATELET # BLD AUTO: 357 K/UL — SIGNIFICANT CHANGE UP (ref 150–400)
PMV BLD: 10.5 FL — SIGNIFICANT CHANGE UP (ref 7–13)
POTASSIUM SERPL-MCNC: 4 MMOL/L — SIGNIFICANT CHANGE UP (ref 3.5–5.3)
POTASSIUM SERPL-SCNC: 4 MMOL/L — SIGNIFICANT CHANGE UP (ref 3.5–5.3)
RBC # BLD: 5.03 M/UL — SIGNIFICANT CHANGE UP (ref 3.8–5.2)
RBC # FLD: 16.7 % — HIGH (ref 10.3–14.5)
SODIUM SERPL-SCNC: 138 MMOL/L — SIGNIFICANT CHANGE UP (ref 135–145)
WBC # BLD: 3.08 K/UL — LOW (ref 3.8–10.5)
WBC # FLD AUTO: 3.08 K/UL — LOW (ref 3.8–10.5)

## 2020-01-18 RX ORDER — PANTOPRAZOLE SODIUM 20 MG/1
40 TABLET, DELAYED RELEASE ORAL
Refills: 0 | Status: DISCONTINUED | OUTPATIENT
Start: 2020-01-18 | End: 2020-01-24

## 2020-01-18 RX ADMIN — HEPARIN SODIUM 5000 UNIT(S): 5000 INJECTION INTRAVENOUS; SUBCUTANEOUS at 05:12

## 2020-01-18 RX ADMIN — LISINOPRIL 5 MILLIGRAM(S): 2.5 TABLET ORAL at 05:14

## 2020-01-18 RX ADMIN — Medication 30 MILLILITER(S): at 21:38

## 2020-01-18 RX ADMIN — HEPARIN SODIUM 5000 UNIT(S): 5000 INJECTION INTRAVENOUS; SUBCUTANEOUS at 13:00

## 2020-01-18 RX ADMIN — HEPARIN SODIUM 5000 UNIT(S): 5000 INJECTION INTRAVENOUS; SUBCUTANEOUS at 21:36

## 2020-01-18 RX ADMIN — PANTOPRAZOLE SODIUM 40 MILLIGRAM(S): 20 TABLET, DELAYED RELEASE ORAL at 18:39

## 2020-01-18 NOTE — PROGRESS NOTE ADULT - SUBJECTIVE AND OBJECTIVE BOX
Patient is a 54y old  Female who presents with a chief complaint of palpitations (17 Jan 2020 10:44)      SUBJECTIVE / OVERNIGHT EVENTS: overnight events noted    ROS:  Resp: No cough no sputum production  CVS: No chest pain no palpitations no orthopnea  GI: no N/V/D  : no dysuria, no hematuria  Neuro: no weakness no paresthesias  Heme: No petechiae no easy bruising  Msk: No joint pain no swelling  Skin: No rash no itching        MEDICATIONS  (STANDING):  dextrose 5%. 1000 milliLiter(s) (50 mL/Hr) IV Continuous <Continuous>  dextrose 50% Injectable 12.5 Gram(s) IV Push once  dextrose 50% Injectable 25 Gram(s) IV Push once  dextrose 50% Injectable 25 Gram(s) IV Push once  heparin  Injectable 5000 Unit(s) SubCutaneous every 8 hours  insulin lispro (HumaLOG) corrective regimen sliding scale   SubCutaneous three times a day before meals  lisinopril 5 milliGRAM(s) Oral daily    MEDICATIONS  (PRN):  dextrose 40% Gel 15 Gram(s) Oral once PRN Blood Glucose LESS THAN 70 milliGRAM(s)/deciliter  glucagon  Injectable 1 milliGRAM(s) IntraMuscular once PRN Glucose LESS THAN 70 milligrams/deciliter        CAPILLARY BLOOD GLUCOSE      POCT Blood Glucose.: 142 mg/dL (18 Jan 2020 17:42)  POCT Blood Glucose.: 93 mg/dL (18 Jan 2020 12:44)  POCT Blood Glucose.: 145 mg/dL (18 Jan 2020 08:30)  POCT Blood Glucose.: 151 mg/dL (17 Jan 2020 21:47)    I&O's Summary      Vital Signs Last 24 Hrs  T(C): 36.8 (18 Jan 2020 12:13), Max: 37.1 (17 Jan 2020 18:52)  T(F): 98.3 (18 Jan 2020 12:13), Max: 98.8 (17 Jan 2020 18:52)  HR: 71 (18 Jan 2020 12:13) (71 - 88)  BP: 112/70 (18 Jan 2020 12:13) (112/70 - 135/77)  BP(mean): --  RR: 18 (18 Jan 2020 12:13) (18 - 18)  SpO2: 100% (18 Jan 2020 12:13) (100% - 100%)    PHYSICAL EXAM:  GENERAL: in no apparent distress  HEAD:  Atraumatic, Normocephalic  EYES: EOMI, PERRLA, conjunctiva and sclera clear  NECK: Supple, No JVD  CHEST/LUNG: no wheeze, clear to auscultation bilaterally  HEART: S1 S2; No rubs or gallops, no murmurs appreciated  ABDOMEN: Soft, Nontender, Nondistended; Bowel sounds present  EXTREMITIES:  No clubbing or cyanosis, + Peripheral Pulses,  no edema  PSYCH: AO x 3 appropriate affect  NEUROLOGY: non-focal, motor and sensory systems intact  SKIN: No rashes or lesions    LABS:                        10.7   3.08  )-----------( 357      ( 18 Jan 2020 06:50 )             36.5     01-18    138  |  105  |  15  ----------------------------<  132<H>  4.0   |  25  |  0.69    Ca    9.4      18 Jan 2020 06:50  Phos  3.6     01-18  Mg     2.0     01-18                  All consultant(s) notes reviewed and care discussed with other providers        Contact Number, Dr Hsieh [phone]

## 2020-01-18 NOTE — PROGRESS NOTE ADULT - SUBJECTIVE AND OBJECTIVE BOX
Patient denies chest pain or shortness of breath.   Review of systems otherwise (-)        dextrose 40% Gel 15 Gram(s) Oral once PRN  dextrose 5%. 1000 milliLiter(s) IV Continuous <Continuous>  dextrose 50% Injectable 12.5 Gram(s) IV Push once  dextrose 50% Injectable 25 Gram(s) IV Push once  dextrose 50% Injectable 25 Gram(s) IV Push once  glucagon  Injectable 1 milliGRAM(s) IntraMuscular once PRN  heparin  Injectable 5000 Unit(s) SubCutaneous every 8 hours  insulin lispro (HumaLOG) corrective regimen sliding scale   SubCutaneous three times a day before meals  lisinopril 5 milliGRAM(s) Oral daily                            10.7   3.08  )-----------( 357      ( 18 Jan 2020 06:50 )             36.5       Hemoglobin: 10.7 g/dL (01-18 @ 06:50)  Hemoglobin: 10.5 g/dL (01-17 @ 06:00)  Hemoglobin: 10.5 g/dL (01-16 @ 06:55)  Hemoglobin: 10.6 g/dL (01-15 @ 13:48)      01-18    138  |  105  |  15  ----------------------------<  132<H>  4.0   |  25  |  0.69    Ca    9.4      18 Jan 2020 06:50  Phos  3.6     01-18  Mg     2.0     01-18      Creatinine Trend: 0.69<--, 0.69<--, 0.68<--, 0.72<--    COAGS:       PHYSICAL EXAM:  Vital Signs last 24 Hours   T(C): 36.8 (01-18-20 @ 12:13), Max: 37.1 (01-17-20 @ 18:52)  HR: 71 (01-18-20 @ 12:13) (71 - 88)  BP: 112/70 (01-18-20 @ 12:13) (112/70 - 135/77)  RR: 18 (01-18-20 @ 12:13) (18 - 18)  SpO2: 100% (01-18-20 @ 12:13) (100% - 100%)  Wt(kg): --    I&O's Summary      Gen: Appears well in NAD  HEENT:  (-)icterus (-)pallor  CV: N S1 S2 1/6 CORTNEY (+)2 Pulses B/l  Resp:  Clear to auscultation B/L, normal effort  GI: (+) BS Soft, NT, ND  Lymph:  (-)Edema, (-)obvious lymphadenopathy  Skin: Warm to touch, Normal turgor  Psych: Appropriate mood and affect    TELEMETRY: 	SR, no events     DIAGNOSTICS:    < from: Transthoracic Echocardiogram (01.16.20 @ 16:35) >  CONCLUSIONS:  1. Normal mitral valve. Minimal mitral regurgitation.  2. Normal left ventricular internal dimensions and wall  thicknesses.  3. Endocardium not well visualized; grossly normal left  ventricular systolic function.  4. The right ventricle is not well visualized; grossly  normal right ventricular systolic function.  ------------------------------------------------------------------------  Confirmed on  1/16/2020 - 17:54:57 by Lawrence Ness M.D.        ASSESSMENT/PLAN: 	    54 year old female PMH DM, and 1 week ago while in [place] patient suffered a reported VF arrest, was shocked and resuscitated, hospitalized with reportedly normal Cardiac cath, left AMA and came to [place] for further care, presented today with palpitations while at work sitting at her desk with heart rates recorded on her apple watch up to 185bpm.    --no events overnight  --pt without cp, or anginal symptoms   --echo as noted  --f/u cardiac MRI eval structural heart - pending   --hx VF arrest, borderlin QTc, f/u EP   --awaiting review OP records   --further reccs based on above

## 2020-01-18 NOTE — PROGRESS NOTE ADULT - SUBJECTIVE AND OBJECTIVE BOX
Subjective: No chest pain or sob; ROS otherwise negative.   	  MEDICATIONS  (STANDING):  dextrose 5%. 1000 milliLiter(s) (50 mL/Hr) IV Continuous <Continuous>  dextrose 50% Injectable 12.5 Gram(s) IV Push once  dextrose 50% Injectable 25 Gram(s) IV Push once  dextrose 50% Injectable 25 Gram(s) IV Push once  heparin  Injectable 5000 Unit(s) SubCutaneous every 8 hours  insulin lispro (HumaLOG) corrective regimen sliding scale   SubCutaneous three times a day before meals  lisinopril 5 milliGRAM(s) Oral daily    MEDICATIONS  (PRN):  dextrose 40% Gel 15 Gram(s) Oral once PRN Blood Glucose LESS THAN 70 milliGRAM(s)/deciliter  glucagon  Injectable 1 milliGRAM(s) IntraMuscular once PRN Glucose LESS THAN 70 milligrams/deciliter      LABS:                     10.7   3.08  )-----------( 357      ( 18 Jan 2020 06:50 )             36.5     138  |  105  |  15  ----------------------------<  132<H>  4.0   |  25  |  0.69    Ca    9.4      18 Jan 2020 06:50  Phos  3.6     01-18  Mg     2.0     01-18    Creatinine Trend: 0.69<--, 0.69<--, 0.68<--, 0.72<--     PHYSICAL EXAM  Vital Signs Last 24 Hrs  T(C): 36.8 (18 Jan 2020 12:13), Max: 37.1 (17 Jan 2020 18:52)  T(F): 98.3 (18 Jan 2020 12:13), Max: 98.8 (17 Jan 2020 18:52)  HR: 71 (18 Jan 2020 12:13) (71 - 88)  BP: 112/70 (18 Jan 2020 12:13) (112/70 - 135/77)  RR: 18 (18 Jan 2020 12:13) (18 - 18)  SpO2: 100% (18 Jan 2020 12:13) (100% - 100%)      Appearance: Normal	  HEENT:   Normal oral mucosa, PERRL, EOMI	  Lymphatic: No lymphadenopathy , no edema  Cardiovascular: Normal S1 S2, No JVD, No murmurs , Peripheral pulses palpable 2+ bilaterally  Respiratory: Lungs clear to auscultation, normal effort 	  Gastrointestinal:  Soft, Non-tender, + BS	  Skin: No rashes, No ecchymoses, No cyanosis, warm to touch  Musculoskeletal: Normal range of motion, normal strength  Psychiatry:  Mood & affect appropriate    TELEMETRY: SR	      ASSESSMENT/PLAN: 	54y Female with history of DM, history of cardiac arrest this month in [place] admitted with palpitations.     -Monitor on tele to rule out malignant arrhythmias  -Prelim review of cath films reveals no obs CAD, f/u REPORT  -EP consult with Dr. Arce appreciated  -Cardiac MRI with mild sedation pending  -Further interventional workup pending above    Allison Dennis MD

## 2020-01-18 NOTE — PROGRESS NOTE ADULT - ATTENDING COMMENTS
Agree with above assessment and plan as outlined above.    - EP f/u   - Cardiac MRI    Felix Grover MD, [facility]  BEEPER [phone]

## 2020-01-18 NOTE — PROGRESS NOTE ADULT - SUBJECTIVE AND OBJECTIVE BOX
EP ATTENDING    tele: NSR, RVOT outflow track PVCs    she denies palpitations, syncope, nor angina, ROS otherwise -    dextrose 40% Gel 15 Gram(s) Oral once PRN  dextrose 5%. 1000 milliLiter(s) IV Continuous <Continuous>  dextrose 50% Injectable 12.5 Gram(s) IV Push once  dextrose 50% Injectable 25 Gram(s) IV Push once  dextrose 50% Injectable 25 Gram(s) IV Push once  glucagon  Injectable 1 milliGRAM(s) IntraMuscular once PRN  heparin  Injectable 5000 Unit(s) SubCutaneous every 8 hours  insulin lispro (HumaLOG) corrective regimen sliding scale   SubCutaneous three times a day before meals  lisinopril 5 milliGRAM(s) Oral daily                            10.7   3.08  )-----------( 357      ( 18 Jan 2020 06:50 )             36.5       01-18    138  |  105  |  15  ----------------------------<  132<H>  4.0   |  25  |  0.69    Ca    9.4      18 Jan 2020 06:50  Phos  3.6     01-18  Mg     2.0     01-18      T(C): 36.8 (01-18-20 @ 12:13), Max: 37.1 (01-17-20 @ 18:52)  HR: 71 (01-18-20 @ 12:13) (71 - 88)  BP: 112/70 (01-18-20 @ 12:13) (112/70 - 135/77)  RR: 18 (01-18-20 @ 12:13) (18 - 18)  SpO2: 100% (01-18-20 @ 12:13) (100% - 100%)  Wt(kg): --    A&O x 3  neurologically intact  no JVD  RRR, no murmurs  CTAB  soft nt/nd  no c/c/e    cath at [place]: unremarkable  cardiac MRI pending  echo: normal LVEFF      A/P) She is a pleasant 55 y/o female PMH DM and reported PAF who had a documented VF arrest while in [facility]. A cath was normal, and then she left AMA to pursue a further workup here in [place]. Echo here is unremarkable. Her baseline EKG has subtle late notching in the inferior leads - her signs and symptoms are highly concerning for an infiltrative cardiomyopathy (not a channelopathy) such as ARVD or cardiac sarcoid or amyloid. Cardiac MRI pending, but I am highly suspect of ARVD as the underlying diagnosis    -get cardiac MRI w/wo contrast here at [facility]  -if cardiac MRI consistent with sarcoid, amyloid or ARVD then will recommend a dual chamber ICD for the secondary prevention of sudden death  -if her cardiac MRI is normal, then I would recommend an EP study (for completeness to rule out a latent AP), followed by dual chamber ICD if she is not found to have an accessory pathway   -f/u with [facility] after discharge  -f/u with me after discharge and dual chamber ICD implant ([employer])  -f/u with her PMD Iggy Carbone at [facility] after discharge

## 2020-01-19 LAB
ANION GAP SERPL CALC-SCNC: 11 MMO/L — SIGNIFICANT CHANGE UP (ref 7–14)
BUN SERPL-MCNC: 15 MG/DL — SIGNIFICANT CHANGE UP (ref 7–23)
CALCIUM SERPL-MCNC: 9.5 MG/DL — SIGNIFICANT CHANGE UP (ref 8.4–10.5)
CHLORIDE SERPL-SCNC: 103 MMOL/L — SIGNIFICANT CHANGE UP (ref 98–107)
CO2 SERPL-SCNC: 23 MMOL/L — SIGNIFICANT CHANGE UP (ref 22–31)
CREAT SERPL-MCNC: 0.7 MG/DL — SIGNIFICANT CHANGE UP (ref 0.5–1.3)
GLUCOSE BLDC GLUCOMTR-MCNC: 134 MG/DL — HIGH (ref 70–99)
GLUCOSE BLDC GLUCOMTR-MCNC: 164 MG/DL — HIGH (ref 70–99)
GLUCOSE BLDC GLUCOMTR-MCNC: 93 MG/DL — SIGNIFICANT CHANGE UP (ref 70–99)
GLUCOSE SERPL-MCNC: 127 MG/DL — HIGH (ref 70–99)
HCT VFR BLD CALC: 37 % — SIGNIFICANT CHANGE UP (ref 34.5–45)
HGB BLD-MCNC: 10.8 G/DL — LOW (ref 11.5–15.5)
MAGNESIUM SERPL-MCNC: 2.1 MG/DL — SIGNIFICANT CHANGE UP (ref 1.6–2.6)
MCHC RBC-ENTMCNC: 21.2 PG — LOW (ref 27–34)
MCHC RBC-ENTMCNC: 29.2 % — LOW (ref 32–36)
MCV RBC AUTO: 72.7 FL — LOW (ref 80–100)
NRBC # FLD: 0 K/UL — SIGNIFICANT CHANGE UP (ref 0–0)
PHOSPHATE SERPL-MCNC: 3.4 MG/DL — SIGNIFICANT CHANGE UP (ref 2.5–4.5)
PLATELET # BLD AUTO: 350 K/UL — SIGNIFICANT CHANGE UP (ref 150–400)
PMV BLD: 10.7 FL — SIGNIFICANT CHANGE UP (ref 7–13)
POTASSIUM SERPL-MCNC: 4 MMOL/L — SIGNIFICANT CHANGE UP (ref 3.5–5.3)
POTASSIUM SERPL-SCNC: 4 MMOL/L — SIGNIFICANT CHANGE UP (ref 3.5–5.3)
RBC # BLD: 5.09 M/UL — SIGNIFICANT CHANGE UP (ref 3.8–5.2)
RBC # FLD: 16.7 % — HIGH (ref 10.3–14.5)
SODIUM SERPL-SCNC: 137 MMOL/L — SIGNIFICANT CHANGE UP (ref 135–145)
WBC # BLD: 3.4 K/UL — LOW (ref 3.8–10.5)
WBC # FLD AUTO: 3.4 K/UL — LOW (ref 3.8–10.5)

## 2020-01-19 RX ADMIN — HEPARIN SODIUM 5000 UNIT(S): 5000 INJECTION INTRAVENOUS; SUBCUTANEOUS at 05:11

## 2020-01-19 RX ADMIN — PANTOPRAZOLE SODIUM 40 MILLIGRAM(S): 20 TABLET, DELAYED RELEASE ORAL at 05:11

## 2020-01-19 RX ADMIN — HEPARIN SODIUM 5000 UNIT(S): 5000 INJECTION INTRAVENOUS; SUBCUTANEOUS at 22:07

## 2020-01-19 RX ADMIN — LISINOPRIL 5 MILLIGRAM(S): 2.5 TABLET ORAL at 05:11

## 2020-01-19 RX ADMIN — HEPARIN SODIUM 5000 UNIT(S): 5000 INJECTION INTRAVENOUS; SUBCUTANEOUS at 14:09

## 2020-01-19 NOTE — PROGRESS NOTE ADULT - SUBJECTIVE AND OBJECTIVE BOX
Patient is a 54y old  Female who presents with a chief complaint of palpitations (17 Jan 2020 10:44)      SUBJECTIVE / OVERNIGHT EVENTS: overnight events noted    ROS:  Resp: No cough no sputum production  CVS: No chest pain no palpitations no orthopnea  GI: no N/V/D  : no dysuria, no hematuria  Neuro: no weakness no paresthesias  Heme: No petechiae no easy bruising  Msk: No joint pain no swelling  Skin: No rash no itching        MEDICATIONS  (STANDING):  dextrose 5%. 1000 milliLiter(s) (50 mL/Hr) IV Continuous <Continuous>  dextrose 50% Injectable 12.5 Gram(s) IV Push once  dextrose 50% Injectable 25 Gram(s) IV Push once  dextrose 50% Injectable 25 Gram(s) IV Push once  heparin  Injectable 5000 Unit(s) SubCutaneous every 8 hours  insulin lispro (HumaLOG) corrective regimen sliding scale   SubCutaneous three times a day before meals  lisinopril 5 milliGRAM(s) Oral daily  pantoprazole    Tablet 40 milliGRAM(s) Oral before breakfast    MEDICATIONS  (PRN):  dextrose 40% Gel 15 Gram(s) Oral once PRN Blood Glucose LESS THAN 70 milliGRAM(s)/deciliter  glucagon  Injectable 1 milliGRAM(s) IntraMuscular once PRN Glucose LESS THAN 70 milligrams/deciliter        CAPILLARY BLOOD GLUCOSE      POCT Blood Glucose.: 93 mg/dL (19 Jan 2020 12:44)  POCT Blood Glucose.: 168 mg/dL (18 Jan 2020 22:01)  POCT Blood Glucose.: 142 mg/dL (18 Jan 2020 17:42)    I&O's Summary      Vital Signs Last 24 Hrs  T(C): 36.8 (19 Jan 2020 13:50), Max: 36.8 (18 Jan 2020 21:38)  T(F): 98.2 (19 Jan 2020 13:50), Max: 98.2 (18 Jan 2020 21:38)  HR: 71 (19 Jan 2020 13:50) (65 - 77)  BP: 122/71 (19 Jan 2020 13:50) (121/69 - 128/69)  BP(mean): --  RR: 17 (19 Jan 2020 13:50) (17 - 18)  SpO2: 100% (19 Jan 2020 13:50) (100% - 100%)    PHYSICAL EXAM:  GENERAL: in no apparent distress  HEAD:  Atraumatic, Normocephalic  EYES: EOMI, PERRLA, conjunctiva and sclera clear  NECK: Supple, No JVD  CHEST/LUNG: no wheeze, clear to auscultation bilaterally  HEART: S1 S2; No rubs or gallops, no murmurs appreciated  ABDOMEN: Soft, Nontender, Nondistended; Bowel sounds present  EXTREMITIES:  No clubbing or cyanosis, + Peripheral Pulses,  no edema  PSYCH: AO x 3 appropriate affect  NEUROLOGY: non-focal, motor and sensory systems intact  SKIN: No rashes or lesions    LABS:                        10.8   3.40  )-----------( 350      ( 19 Jan 2020 06:07 )             37.0     01-19    137  |  103  |  15  ----------------------------<  127<H>  4.0   |  23  |  0.70    Ca    9.5      19 Jan 2020 06:07  Phos  3.4     01-19  Mg     2.1     01-19                  All consultant(s) notes reviewed and care discussed with other providers        Contact Number, Dr Hsieh [phone]

## 2020-01-19 NOTE — PROVIDER CONTACT NOTE (OTHER) - ASSESSMENT
Pt had intermittent 2nd Degree Type 2 Block. Vitals stable, pt is asymptomatic. Just one episode. Pt had intermittent 2nd Degree Type 1 Block. Vitals stable, pt is asymptomatic. Just one episode.

## 2020-01-19 NOTE — PROGRESS NOTE ADULT - ATTENDING COMMENTS
Agree with above assessment and plan as outlined above.     -awaiting cardiac MRI ? ARVD    Felix Grover MD, [facility]  BEEPER [phone]

## 2020-01-19 NOTE — PROGRESS NOTE ADULT - SUBJECTIVE AND OBJECTIVE BOX
Patient denies chest pain or shortness of breath.   Review of systems otherwise (-)         dextrose 40% Gel 15 Gram(s) Oral once PRN  dextrose 5%. 1000 milliLiter(s) IV Continuous <Continuous>  dextrose 50% Injectable 12.5 Gram(s) IV Push once  dextrose 50% Injectable 25 Gram(s) IV Push once  dextrose 50% Injectable 25 Gram(s) IV Push once  glucagon  Injectable 1 milliGRAM(s) IntraMuscular once PRN  heparin  Injectable 5000 Unit(s) SubCutaneous every 8 hours  insulin lispro (HumaLOG) corrective regimen sliding scale   SubCutaneous three times a day before meals  lisinopril 5 milliGRAM(s) Oral daily  pantoprazole    Tablet 40 milliGRAM(s) Oral before breakfast                            10.7   3.08  )-----------( 357      ( 18 Jan 2020 06:50 )             36.5       Hemoglobin: 10.7 g/dL (01-18 @ 06:50)  Hemoglobin: 10.5 g/dL (01-17 @ 06:00)  Hemoglobin: 10.5 g/dL (01-16 @ 06:55)  Hemoglobin: 10.6 g/dL (01-15 @ 13:48)      01-18    138  |  105  |  15  ----------------------------<  132<H>  4.0   |  25  |  0.69    Ca    9.4      18 Jan 2020 06:50  Phos  3.6     01-18  Mg     2.0     01-18      Creatinine Trend: 0.69<--, 0.69<--, 0.68<--, 0.72<--    COAGS:           T(C): 36.7 (01-19-20 @ 05:10), Max: 36.8 (01-18-20 @ 12:13)  HR: 65 (01-19-20 @ 05:10) (65 - 77)  BP: 121/69 (01-19-20 @ 05:10) (112/70 - 128/69)  RR: 18 (01-19-20 @ 05:10) (18 - 18)  SpO2: 100% (01-19-20 @ 05:10) (100% - 100%)  Wt(kg): --    I&O's Summary    Gen: Appears well in NAD  HEENT:  (-)icterus (-)pallor  CV: N S1 S2 1/6 CORTNEY (+)2 Pulses B/l  Resp:  Clear to auscultation B/L, normal effort  GI: (+) BS Soft, NT, ND  Lymph:  (-)Edema, (-)obvious lymphadenopathy  Skin: Warm to touch, Normal turgor  Psych: Appropriate mood and affect    TELEMETRY: 	SR, no events     DIAGNOSTICS:    < from: Transthoracic Echocardiogram (01.16.20 @ 16:35) >  CONCLUSIONS:  1. Normal mitral valve. Minimal mitral regurgitation.  2. Normal left ventricular internal dimensions and wall  thicknesses.  3. Endocardium not well visualized; grossly normal left  ventricular systolic function.  4. The right ventricle is not well visualized; grossly  normal right ventricular systolic function.  ------------------------------------------------------------------------  Confirmed on  1/16/2020 - 17:54:57 by Lawrence Ness M.D.        ASSESSMENT/PLAN: 	    54 year old female PMH DM, and 1 week ago while in [place] patient suffered a reported VF arrest, was shocked and resuscitated, hospitalized with reportedly normal Cardiac cath, left AMA and came to [place] for further care, presented today with palpitations while at work sitting at her desk with heart rates recorded on her apple watch up to 185bpm.     --pt without cp, or anginal symptoms   --echo as noted  --f/u cardiac MRI eval structural heart - pending   --hx VF arrest, borderlin QTc, f/u EP   --awaiting review OP records   --further reccs based on above Patient denies chest pain or shortness of breath.   Review of systems otherwise (-)         dextrose 40% Gel 15 Gram(s) Oral once PRN  dextrose 5%. 1000 milliLiter(s) IV Continuous <Continuous>  dextrose 50% Injectable 12.5 Gram(s) IV Push once  dextrose 50% Injectable 25 Gram(s) IV Push once  dextrose 50% Injectable 25 Gram(s) IV Push once  glucagon  Injectable 1 milliGRAM(s) IntraMuscular once PRN  heparin  Injectable 5000 Unit(s) SubCutaneous every 8 hours  insulin lispro (HumaLOG) corrective regimen sliding scale   SubCutaneous three times a day before meals  lisinopril 5 milliGRAM(s) Oral daily  pantoprazole    Tablet 40 milliGRAM(s) Oral before breakfast                            10.7   3.08  )-----------( 357      ( 18 Jan 2020 06:50 )             36.5       Hemoglobin: 10.7 g/dL (01-18 @ 06:50)  Hemoglobin: 10.5 g/dL (01-17 @ 06:00)  Hemoglobin: 10.5 g/dL (01-16 @ 06:55)  Hemoglobin: 10.6 g/dL (01-15 @ 13:48)      01-18    138  |  105  |  15  ----------------------------<  132<H>  4.0   |  25  |  0.69    Ca    9.4      18 Jan 2020 06:50  Phos  3.6     01-18  Mg     2.0     01-18      Creatinine Trend: 0.69<--, 0.69<--, 0.68<--, 0.72<--    COAGS:           T(C): 36.7 (01-19-20 @ 05:10), Max: 36.8 (01-18-20 @ 12:13)  HR: 65 (01-19-20 @ 05:10) (65 - 77)  BP: 121/69 (01-19-20 @ 05:10) (112/70 - 128/69)  RR: 18 (01-19-20 @ 05:10) (18 - 18)  SpO2: 100% (01-19-20 @ 05:10) (100% - 100%)  Wt(kg): --    I&O's Summary    Gen: Appears well in NAD  HEENT:  (-)icterus (-)pallor  CV: N S1 S2 1/6 CORTNEY (+)2 Pulses B/l  Resp:  Clear to auscultation B/L, normal effort  GI: (+) BS Soft, NT, ND  Lymph:  (-)Edema, (-)obvious lymphadenopathy  Skin: Warm to touch, Normal turgor  Psych: Appropriate mood and affect    TELEMETRY: 	SR, no events     DIAGNOSTICS:    < from: Transthoracic Echocardiogram (01.16.20 @ 16:35) >  CONCLUSIONS:  1. Normal mitral valve. Minimal mitral regurgitation.  2. Normal left ventricular internal dimensions and wall  thicknesses.  3. Endocardium not well visualized; grossly normal left  ventricular systolic function.  4. The right ventricle is not well visualized; grossly  normal right ventricular systolic function.  ------------------------------------------------------------------------  Confirmed on  1/16/2020 - 17:54:57 by Lawrence Ness M.D.        ASSESSMENT/PLAN: 	    54 year old female PMH DM, and 1 week ago while in [place] patient suffered a reported VF arrest, was shocked and resuscitated, hospitalized with reportedly normal Cardiac cath, left AMA and came to [place] for further care, presented today with palpitations while at work sitting at her desk with heart rates recorded on her apple watch up to 185bpm.     --pt without cp, or anginal symptoms   --echo as noted  --f/u cardiac MRI  - pending   --hx VF arrest, borderlin QTc, f/u EP   --awaiting review OP records   --further reccs based on above

## 2020-01-19 NOTE — PROGRESS NOTE ADULT - PROBLEM SELECTOR PLAN 4
agree with EP evaluation if cardiology attending feels needed  continue Tele monitoring normal... Well appearing, morbidly obese, awake, alert, and in no apparent distress.

## 2020-01-19 NOTE — PROGRESS NOTE ADULT - SUBJECTIVE AND OBJECTIVE BOX
Subjective: No chest pain or sob; ROS otherwise negative.         MEDICATIONS  (STANDING):  dextrose 5%. 1000 milliLiter(s) (50 mL/Hr) IV Continuous <Continuous>  dextrose 50% Injectable 12.5 Gram(s) IV Push once  dextrose 50% Injectable 25 Gram(s) IV Push once  dextrose 50% Injectable 25 Gram(s) IV Push once  heparin  Injectable 5000 Unit(s) SubCutaneous every 8 hours  insulin lispro (HumaLOG) corrective regimen sliding scale   SubCutaneous three times a day before meals  lisinopril 5 milliGRAM(s) Oral daily  pantoprazole    Tablet 40 milliGRAM(s) Oral before breakfast    MEDICATIONS  (PRN):  dextrose 40% Gel 15 Gram(s) Oral once PRN Blood Glucose LESS THAN 70 milliGRAM(s)/deciliter  glucagon  Injectable 1 milliGRAM(s) IntraMuscular once PRN Glucose LESS THAN 70 milligrams/deciliter      LABS:                            10.8   3.40  )-----------( 350      ( 19 Jan 2020 06:07 )             37.0     Hemoglobin: 10.8 g/dL (01-19 @ 06:07)  Hemoglobin: 10.7 g/dL (01-18 @ 06:50)  Hemoglobin: 10.5 g/dL (01-17 @ 06:00)  Hemoglobin: 10.5 g/dL (01-16 @ 06:55)  Hemoglobin: 10.6 g/dL (01-15 @ 13:48)    01-19    137  |  103  |  15  ----------------------------<  127<H>  4.0   |  23  |  0.70    Ca    9.5      19 Jan 2020 06:07  Phos  3.4     01-19  Mg     2.1     01-19      Creatinine Trend: 0.70<--, 0.69<--, 0.69<--, 0.68<--, 0.72<--           PHYSICAL EXAM  Vital Signs Last 24 Hrs  T(C): 36.7 (19 Jan 2020 05:10), Max: 36.8 (18 Jan 2020 12:13)  T(F): 98.1 (19 Jan 2020 05:10), Max: 98.3 (18 Jan 2020 12:13)  HR: 65 (19 Jan 2020 05:10) (65 - 77)  BP: 121/69 (19 Jan 2020 05:10) (112/70 - 128/69)  BP(mean): --  RR: 18 (19 Jan 2020 05:10) (18 - 18)  SpO2: 100% (19 Jan 2020 05:10) (100% - 100%)      Appearance: Normal	  HEENT:   Normal oral mucosa, PERRL, EOMI	  Lymphatic: No lymphadenopathy , no edema  Cardiovascular: Normal S1 S2, No JVD, No murmurs , Peripheral pulses palpable 2+ bilaterally  Respiratory: Lungs clear to auscultation, normal effort 	  Gastrointestinal:  Soft, Non-tender, + BS	  Skin: No rashes, No ecchymoses, No cyanosis, warm to touch  Musculoskeletal: Normal range of motion, normal strength  Psychiatry:  Mood & affect appropriate    TELEMETRY: SR	        DIAGNOSTIC  < from: Transthoracic Echocardiogram (01.16.20 @ 16:35) >  OBSERVATIONS:  Mitral Valve: Normal mitral valve. Minimal mitral  regurgitation.  Aortic Root: Normal aortic root.  Aortic Valve: Normal trileaflet aortic valve.  Left Atrium: Normal left atrium.  Left Ventricle: Endocardium not well visualized; grossly  normal left ventricular systolic function. Normal left  ventricular internal dimensions and wall thicknesses.  Right Heart: Normal right atrium. The right ventricle is  not well visualized; grossly normal right ventricular  systolic function. Normal tricuspid valve. Minimal  tricuspid regurgitation. Normal pulmonic valve.  Pericardium/PleuraNormal pericardium with no pericardial  effusion.  ------------------------------------------------------------------------  CONCLUSIONS:  1. Normal mitral valve. Minimal mitral regurgitation.  2. Normal left ventricular internal dimensions and wall  thicknesses.  3. Endocardium not well visualized; grossly normal left  ventricular systolic function.  4. The right ventricle is not well visualized; grossly  normal right ventricular systolic function.    < end of copied text >      ASSESSMENT/PLAN: 	54y Female with history of DM, history of cardiac arrest this month in [place] admitted with palpitations.     -Monitor on tele to rule out malignant arrhythmias  -Prelim review of cath films reveals no obs CAD, f/u REPORT  -EP consult with Dr. Arce appreciated  -TTE with normal BI-V function no valve disease   -Cardiac MRI with mild sedation pending  -Further interventional workup pending above

## 2020-01-20 LAB
ANION GAP SERPL CALC-SCNC: 9 MMO/L — SIGNIFICANT CHANGE UP (ref 7–14)
BUN SERPL-MCNC: 15 MG/DL — SIGNIFICANT CHANGE UP (ref 7–23)
CALCIUM SERPL-MCNC: 9.4 MG/DL — SIGNIFICANT CHANGE UP (ref 8.4–10.5)
CHLORIDE SERPL-SCNC: 103 MMOL/L — SIGNIFICANT CHANGE UP (ref 98–107)
CO2 SERPL-SCNC: 25 MMOL/L — SIGNIFICANT CHANGE UP (ref 22–31)
CREAT SERPL-MCNC: 0.7 MG/DL — SIGNIFICANT CHANGE UP (ref 0.5–1.3)
GLUCOSE BLDC GLUCOMTR-MCNC: 100 MG/DL — HIGH (ref 70–99)
GLUCOSE BLDC GLUCOMTR-MCNC: 112 MG/DL — HIGH (ref 70–99)
GLUCOSE BLDC GLUCOMTR-MCNC: 143 MG/DL — HIGH (ref 70–99)
GLUCOSE BLDC GLUCOMTR-MCNC: 242 MG/DL — HIGH (ref 70–99)
GLUCOSE SERPL-MCNC: 123 MG/DL — HIGH (ref 70–99)
HCT VFR BLD CALC: 36.1 % — SIGNIFICANT CHANGE UP (ref 34.5–45)
HGB BLD-MCNC: 10.7 G/DL — LOW (ref 11.5–15.5)
MAGNESIUM SERPL-MCNC: 2.1 MG/DL — SIGNIFICANT CHANGE UP (ref 1.6–2.6)
MCHC RBC-ENTMCNC: 21.4 PG — LOW (ref 27–34)
MCHC RBC-ENTMCNC: 29.6 % — LOW (ref 32–36)
MCV RBC AUTO: 72.2 FL — LOW (ref 80–100)
NRBC # FLD: 0 K/UL — SIGNIFICANT CHANGE UP (ref 0–0)
PHOSPHATE SERPL-MCNC: 3.2 MG/DL — SIGNIFICANT CHANGE UP (ref 2.5–4.5)
PLATELET # BLD AUTO: 339 K/UL — SIGNIFICANT CHANGE UP (ref 150–400)
PMV BLD: 10.2 FL — SIGNIFICANT CHANGE UP (ref 7–13)
POTASSIUM SERPL-MCNC: 4.2 MMOL/L — SIGNIFICANT CHANGE UP (ref 3.5–5.3)
POTASSIUM SERPL-SCNC: 4.2 MMOL/L — SIGNIFICANT CHANGE UP (ref 3.5–5.3)
RBC # BLD: 5 M/UL — SIGNIFICANT CHANGE UP (ref 3.8–5.2)
RBC # FLD: 16.7 % — HIGH (ref 10.3–14.5)
SODIUM SERPL-SCNC: 137 MMOL/L — SIGNIFICANT CHANGE UP (ref 135–145)
WBC # BLD: 3.39 K/UL — LOW (ref 3.8–10.5)
WBC # FLD AUTO: 3.39 K/UL — LOW (ref 3.8–10.5)

## 2020-01-20 RX ADMIN — HEPARIN SODIUM 5000 UNIT(S): 5000 INJECTION INTRAVENOUS; SUBCUTANEOUS at 22:29

## 2020-01-20 RX ADMIN — LISINOPRIL 5 MILLIGRAM(S): 2.5 TABLET ORAL at 06:05

## 2020-01-20 RX ADMIN — HEPARIN SODIUM 5000 UNIT(S): 5000 INJECTION INTRAVENOUS; SUBCUTANEOUS at 06:05

## 2020-01-20 RX ADMIN — PANTOPRAZOLE SODIUM 40 MILLIGRAM(S): 20 TABLET, DELAYED RELEASE ORAL at 06:05

## 2020-01-20 RX ADMIN — HEPARIN SODIUM 5000 UNIT(S): 5000 INJECTION INTRAVENOUS; SUBCUTANEOUS at 14:48

## 2020-01-20 NOTE — PROGRESS NOTE ADULT - SUBJECTIVE AND OBJECTIVE BOX
Subjective: No chest pain or sob; ROS otherwise negative.       dextrose 40% Gel 15 Gram(s) Oral once PRN  dextrose 5%. 1000 milliLiter(s) IV Continuous <Continuous>  dextrose 50% Injectable 12.5 Gram(s) IV Push once  dextrose 50% Injectable 25 Gram(s) IV Push once  dextrose 50% Injectable 25 Gram(s) IV Push once  glucagon  Injectable 1 milliGRAM(s) IntraMuscular once PRN  heparin  Injectable 5000 Unit(s) SubCutaneous every 8 hours  insulin lispro (HumaLOG) corrective regimen sliding scale   SubCutaneous three times a day before meals  lisinopril 5 milliGRAM(s) Oral daily  pantoprazole    Tablet 40 milliGRAM(s) Oral before breakfast                            10.7   3.39  )-----------( 339      ( 20 Jan 2020 06:23 )             36.1       01-20    137  |  103  |  15  ----------------------------<  123<H>  4.2   |  25  |  0.70    Ca    9.4      20 Jan 2020 06:23  Phos  3.2     01-20  Mg     2.1     01-20              T(C): 36.7 (01-20-20 @ 12:41), Max: 36.9 (01-20-20 @ 06:04)  HR: 71 (01-20-20 @ 12:41) (65 - 71)  BP: 105/71 (01-20-20 @ 12:41) (105/71 - 122/71)  RR: 17 (01-20-20 @ 12:41) (16 - 17)  SpO2: 98% (01-20-20 @ 12:41) (98% - 100%)  Wt(kg): --    I&O's Summary      Appearance: Normal	  HEENT:   Normal oral mucosa, PERRL, EOMI	  Lymphatic: No lymphadenopathy , no edema  Cardiovascular: Normal S1 S2, No JVD, No murmurs , Peripheral pulses palpable 2+ bilaterally  Respiratory: Lungs clear to auscultation, normal effort 	  Gastrointestinal:  Soft, Non-tender, + BS	  Skin: No rashes, No ecchymoses, No cyanosis, warm to touch  Musculoskeletal: Normal range of motion, normal strength  Psychiatry:  Mood & affect appropriate    TELEMETRY: Itz SCHMITT 	        DIAGNOSTIC  < from: Transthoracic Echocardiogram (01.16.20 @ 16:35) >  OBSERVATIONS:  Mitral Valve: Normal mitral valve. Minimal mitral  regurgitation.  Aortic Root: Normal aortic root.  Aortic Valve: Normal trileaflet aortic valve.  Left Atrium: Normal left atrium.  Left Ventricle: Endocardium not well visualized; grossly  normal left ventricular systolic function. Normal left  ventricular internal dimensions and wall thicknesses.  Right Heart: Normal right atrium. The right ventricle is  not well visualized; grossly normal right ventricular  systolic function. Normal tricuspid valve. Minimal  tricuspid regurgitation. Normal pulmonic valve.  Pericardium/PleuraNormal pericardium with no pericardial  effusion.  ------------------------------------------------------------------------  CONCLUSIONS:  1. Normal mitral valve. Minimal mitral regurgitation.  2. Normal left ventricular internal dimensions and wall  thicknesses.  3. Endocardium not well visualized; grossly normal left  ventricular systolic function.  4. The right ventricle is not well visualized; grossly  normal right ventricular systolic function.    < end of copied text >      ASSESSMENT/PLAN: 	54y Female with history of DM, history of cardiac arrest this month in [place] admitted with palpitations.     -Monitor on tele to rule out malignant arrhythmias  -Follow up cath report   -EP consult with Dr. Arce appreciated  -TTE with normal BI-V function no valve disease   -Cardiac MRI with mild sedation pending  -Further interventional workup pending above    Allison Dennis MD

## 2020-01-20 NOTE — CHART NOTE - NSCHARTNOTEFT_GEN_A_CORE
PA Note    Called by nurse for 2nd Degree AVB Type II seen on tele  Pt is asymptomatic    Vital Signs Last 24 Hrs  T(C): 36.7 (19 Jan 2020 23:51), Max: 36.8 (19 Jan 2020 13:50)  T(F): 98.1 (19 Jan 2020 23:51), Max: 98.2 (19 Jan 2020 13:50)  HR: 67 (19 Jan 2020 23:51) (65 - 71)  BP: 110/75 (19 Jan 2020 23:51) (108/71 - 122/71)  RR: 17 (19 Jan 2020 23:51) (17 - 18)  SpO2: 100% (19 Jan 2020 23:51) (99% - 100%)    Tele reviewed: Sinus rhythm, 2nd Degree AV Block Type I, PVC noted    Will avoid AVN Blocker  Will FU with EP in AM   Will continue to monitor

## 2020-01-20 NOTE — PROGRESS NOTE ADULT - ATTENDING COMMENTS
Agree with above assessment and plan as outlined above.    - f/u cardiac MRI  - EP f/u appreciated    Felix Grover MD, [facility]  BEEPER [phone]

## 2020-01-20 NOTE — PROGRESS NOTE ADULT - SUBJECTIVE AND OBJECTIVE BOX
Patient is a 54y old  Female who presents with a chief complaint of palpitations (17 Jan 2020 10:44)      SUBJECTIVE / OVERNIGHT EVENTS: overnight events noted    ROS:  Resp: No cough no sputum production  CVS: No chest pain no palpitations no orthopnea  GI: no N/V/D  : no dysuria, no hematuria  Neuro: no weakness no paresthesias  Heme: No petechiae no easy bruising  Msk: No joint pain no swelling  Skin: No rash no itching        MEDICATIONS  (STANDING):  dextrose 5%. 1000 milliLiter(s) (50 mL/Hr) IV Continuous <Continuous>  dextrose 50% Injectable 12.5 Gram(s) IV Push once  dextrose 50% Injectable 25 Gram(s) IV Push once  dextrose 50% Injectable 25 Gram(s) IV Push once  heparin  Injectable 5000 Unit(s) SubCutaneous every 8 hours  insulin lispro (HumaLOG) corrective regimen sliding scale   SubCutaneous three times a day before meals  lisinopril 5 milliGRAM(s) Oral daily  pantoprazole    Tablet 40 milliGRAM(s) Oral before breakfast    MEDICATIONS  (PRN):  dextrose 40% Gel 15 Gram(s) Oral once PRN Blood Glucose LESS THAN 70 milliGRAM(s)/deciliter  glucagon  Injectable 1 milliGRAM(s) IntraMuscular once PRN Glucose LESS THAN 70 milligrams/deciliter        CAPILLARY BLOOD GLUCOSE      POCT Blood Glucose.: 100 mg/dL (20 Jan 2020 12:27)  POCT Blood Glucose.: 112 mg/dL (20 Jan 2020 08:27)  POCT Blood Glucose.: 164 mg/dL (19 Jan 2020 20:58)  POCT Blood Glucose.: 134 mg/dL (19 Jan 2020 17:23)    I&O's Summary      Vital Signs Last 24 Hrs  T(C): 36.7 (20 Jan 2020 12:41), Max: 36.9 (20 Jan 2020 06:04)  T(F): 98 (20 Jan 2020 12:41), Max: 98.5 (20 Jan 2020 06:04)  HR: 71 (20 Jan 2020 12:41) (65 - 71)  BP: 105/71 (20 Jan 2020 12:41) (105/71 - 122/71)  BP(mean): --  RR: 17 (20 Jan 2020 12:41) (16 - 17)  SpO2: 98% (20 Jan 2020 12:41) (98% - 100%)    PHYSICAL EXAM:  GENERAL: in no apparent distress  HEAD:  Atraumatic, Normocephalic  EYES: EOMI, PERRLA, conjunctiva and sclera clear  NECK: Supple, No JVD  CHEST/LUNG: no wheeze, clear to auscultation bilaterally  HEART: S1 S2; No rubs or gallops, no murmurs appreciated  ABDOMEN: Soft, Nontender, Nondistended; Bowel sounds present  EXTREMITIES:  No clubbing or cyanosis, + Peripheral Pulses,  no edema  PSYCH: AO x 3 appropriate affect  NEUROLOGY: non-focal, motor and sensory systems intact  SKIN: No rashes or lesions    LABS:                        10.7   3.39  )-----------( 339      ( 20 Jan 2020 06:23 )             36.1     01-20    137  |  103  |  15  ----------------------------<  123<H>  4.2   |  25  |  0.70    Ca    9.4      20 Jan 2020 06:23  Phos  3.2     01-20  Mg     2.1     01-20                  All consultant(s) notes reviewed and care discussed with other providers        Contact Number, Dr Hsieh [phone]

## 2020-01-20 NOTE — PROGRESS NOTE ADULT - SUBJECTIVE AND OBJECTIVE BOX
EP     tele: NSR, RVOT outflow track PVCs    she denies palpitations, syncope, nor angina, ROS otherwise -      MEDICATIONS  (STANDING):  dextrose 5%. 1000 milliLiter(s) (50 mL/Hr) IV Continuous <Continuous>  dextrose 50% Injectable 12.5 Gram(s) IV Push once  dextrose 50% Injectable 25 Gram(s) IV Push once  dextrose 50% Injectable 25 Gram(s) IV Push once  heparin  Injectable 5000 Unit(s) SubCutaneous every 8 hours  insulin lispro (HumaLOG) corrective regimen sliding scale   SubCutaneous three times a day before meals  lisinopril 5 milliGRAM(s) Oral daily  pantoprazole    Tablet 40 milliGRAM(s) Oral before breakfast    MEDICATIONS  (PRN):  dextrose 40% Gel 15 Gram(s) Oral once PRN Blood Glucose LESS THAN 70 milliGRAM(s)/deciliter  glucagon  Injectable 1 milliGRAM(s) IntraMuscular once PRN Glucose LESS THAN 70 milligrams/deciliter      LABS:                            10.7   3.39  )-----------( 339      ( 20 Jan 2020 06:23 )             36.1     137  |  103  |  15  ----------------------------<  123<H>  4.2   |  25  |  0.70    Ca    9.4      20 Jan 2020 06:23  Phos  3.2     01-20  Mg     2.1     01-20    Creatinine Trend: 0.70<--, 0.70<--, 0.69<--, 0.69<--, 0.68<--, 0.72<--     PHYSICAL EXAM  Vital Signs Last 24 Hrs  T(C): 36.7 (20 Jan 2020 12:41), Max: 36.9 (20 Jan 2020 06:04)  T(F): 98 (20 Jan 2020 12:41), Max: 98.5 (20 Jan 2020 06:04)  HR: 71 (20 Jan 2020 12:41) (65 - 71)  BP: 105/71 (20 Jan 2020 12:41) (105/71 - 122/71)  RR: 17 (20 Jan 2020 12:41) (16 - 17)  SpO2: 98% (20 Jan 2020 12:41) (98% - 100%)    A&O x 3  neurologically intact  no JVD  RRR, no murmurs  CTAB  soft nt/nd  no c/c/e    cath at [facility]: unremarkable  cardiac MRI pending  echo: normal LVEFF      A/P) She is a pleasant 53 y/o female PMH DM and reported PAF who had a documented VF arrest while in [facility]. A cath was normal, and then she left AMA to pursue a further workup here in [place]. Echo here is unremarkable. Her baseline EKG has subtle late notching in the inferior leads - her signs and symptoms are highly concerning for an infiltrative cardiomyopathy (not a channelopathy) such as ARVD or cardiac sarcoid or amyloid. Cardiac MRI pending, but I am highly suspect of ARVD as the underlying diagnosis    -get cardiac MRI w/wo contrast here at [facility]  -if cardiac MRI consistent with sarcoid, amyloid or ARVD then will recommend a dual chamber ICD for the secondary prevention of sudden death  -if her cardiac MRI is normal, then I would recommend an EP study (for completeness to rule out a latent AP), followed by dual chamber ICD if she is not found to have an accessory pathway   -f/u with [facility] after discharge  -f/u with me after discharge and dual chamber ICD implant ([employer])  -f/u with her PMD Iggy Carbone at [facility] after discharge

## 2020-01-20 NOTE — PROGRESS NOTE ADULT - SUBJECTIVE AND OBJECTIVE BOX
Patient denies chest pain or shortness of breath.   Review of systems otherwise (-)       dextrose 40% Gel 15 Gram(s) Oral once PRN  dextrose 5%. 1000 milliLiter(s) IV Continuous <Continuous>  dextrose 50% Injectable 12.5 Gram(s) IV Push once  dextrose 50% Injectable 25 Gram(s) IV Push once  dextrose 50% Injectable 25 Gram(s) IV Push once  glucagon  Injectable 1 milliGRAM(s) IntraMuscular once PRN  heparin  Injectable 5000 Unit(s) SubCutaneous every 8 hours  insulin lispro (HumaLOG) corrective regimen sliding scale   SubCutaneous three times a day before meals  lisinopril 5 milliGRAM(s) Oral daily  pantoprazole    Tablet 40 milliGRAM(s) Oral before breakfast                          10.7   3.39  )-----------( 339      ( 20 Jan 2020 06:23 )             36.1     Hemoglobin: 10.7 g/dL (01-20 @ 06:23)  Hemoglobin: 10.8 g/dL (01-19 @ 06:07)  Hemoglobin: 10.7 g/dL (01-18 @ 06:50)  Hemoglobin: 10.5 g/dL (01-17 @ 06:00)  Hemoglobin: 10.5 g/dL (01-16 @ 06:55)      01-20    137  |  103  |  15  ----------------------------<  123<H>  4.2   |  25  |  0.70    Ca    9.4      20 Jan 2020 06:23  Phos  3.2     01-20  Mg     2.1     01-20      Creatinine Trend: 0.70<--, 0.70<--, 0.69<--, 0.69<--, 0.68<--, 0.72<--    COAGS:       PHYSICAL EXAM:  Vital Signs last 24 Hours   T(C): 36.7 (01-20-20 @ 12:41), Max: 36.9 (01-20-20 @ 06:04)  HR: 71 (01-20-20 @ 12:41) (65 - 71)  BP: 105/71 (01-20-20 @ 12:41) (105/71 - 122/71)  RR: 17 (01-20-20 @ 12:41) (16 - 17)  SpO2: 98% (01-20-20 @ 12:41) (98% - 100%)  Wt(kg): --    I&O's Summary      Gen: Appears well in NAD  HEENT:  (-)icterus (-)pallor  CV: N S1 S2 1/6 CORTNEY (+)2 Pulses B/l  Resp:  Clear to auscultation B/L, normal effort  GI: (+) BS Soft, NT, ND  Lymph:  (-)Edema, (-)obvious lymphadenopathy  Skin: Warm to touch, Normal turgor  Psych: Appropriate mood and affect    TELEMETRY: 	SR, no events     DIAGNOSTICS:    < from: Transthoracic Echocardiogram (01.16.20 @ 16:35) >  CONCLUSIONS:  1. Normal mitral valve. Minimal mitral regurgitation.  2. Normal left ventricular internal dimensions and wall  thicknesses.  3. Endocardium not well visualized; grossly normal left  ventricular systolic function.  4. The right ventricle is not well visualized; grossly  normal right ventricular systolic function.  ------------------------------------------------------------------------  Confirmed on  1/16/2020 - 17:54:57 by Lawrence Ness M.D.        ASSESSMENT/PLAN: 	    54 year old female PMH DM, and 1 week ago while in [place] patient suffered a reported VF arrest, was shocked and resuscitated, hospitalized with reportedly normal Cardiac cath, left AMA and came to [place] for further care, presented today with palpitations while at work sitting at her desk with heart rates recorded on her apple watch up to 185bpm.     --pt without cp, or anginal symptoms   --echo as noted  --f/u cardiac MRI  - pending for tomorrow   --hx VF arrest, borderline QTc, EP f/u appreciated   --awaiting review OP records   --further reccs based on above  --on d/c pt to follow with Dr. Hines and Dr. Arce Patient denies chest pain or shortness of breath.   Review of systems otherwise (-)       dextrose 40% Gel 15 Gram(s) Oral once PRN  dextrose 5%. 1000 milliLiter(s) IV Continuous <Continuous>  dextrose 50% Injectable 12.5 Gram(s) IV Push once  dextrose 50% Injectable 25 Gram(s) IV Push once  dextrose 50% Injectable 25 Gram(s) IV Push once  glucagon  Injectable 1 milliGRAM(s) IntraMuscular once PRN  heparin  Injectable 5000 Unit(s) SubCutaneous every 8 hours  insulin lispro (HumaLOG) corrective regimen sliding scale   SubCutaneous three times a day before meals  lisinopril 5 milliGRAM(s) Oral daily  pantoprazole    Tablet 40 milliGRAM(s) Oral before breakfast                          10.7   3.39  )-----------( 339      ( 20 Jan 2020 06:23 )             36.1     Hemoglobin: 10.7 g/dL (01-20 @ 06:23)  Hemoglobin: 10.8 g/dL (01-19 @ 06:07)  Hemoglobin: 10.7 g/dL (01-18 @ 06:50)  Hemoglobin: 10.5 g/dL (01-17 @ 06:00)  Hemoglobin: 10.5 g/dL (01-16 @ 06:55)      01-20    137  |  103  |  15  ----------------------------<  123<H>  4.2   |  25  |  0.70    Ca    9.4      20 Jan 2020 06:23  Phos  3.2     01-20  Mg     2.1     01-20      Creatinine Trend: 0.70<--, 0.70<--, 0.69<--, 0.69<--, 0.68<--, 0.72<--    COAGS:       PHYSICAL EXAM:  Vital Signs last 24 Hours   T(C): 36.7 (01-20-20 @ 12:41), Max: 36.9 (01-20-20 @ 06:04)  HR: 71 (01-20-20 @ 12:41) (65 - 71)  BP: 105/71 (01-20-20 @ 12:41) (105/71 - 122/71)  RR: 17 (01-20-20 @ 12:41) (16 - 17)  SpO2: 98% (01-20-20 @ 12:41) (98% - 100%)  Wt(kg): --    I&O's Summary      Gen: Appears well in NAD  HEENT:  (-)icterus (-)pallor  CV: N S1 S2 1/6 CORTNEY (+)2 Pulses B/l  Resp:  Clear to auscultation B/L, normal effort  GI: (+) BS Soft, NT, ND  Lymph:  (-)Edema, (-)obvious lymphadenopathy  Skin: Warm to touch, Normal turgor  Psych: Appropriate mood and affect    TELEMETRY: 	SR, no events     DIAGNOSTICS:    < from: Transthoracic Echocardiogram (01.16.20 @ 16:35) >  CONCLUSIONS:  1. Normal mitral valve. Minimal mitral regurgitation.  2. Normal left ventricular internal dimensions and wall  thicknesses.  3. Endocardium not well visualized; grossly normal left  ventricular systolic function.  4. The right ventricle is not well visualized; grossly  normal right ventricular systolic function.  ------------------------------------------------------------------------  Confirmed on  1/16/2020 - 17:54:57 by Lawrence Ness M.D.        ASSESSMENT/PLAN: 	    54 year old female PMH DM, and 1 week ago while in [place] patient suffered a reported VF arrest, was shocked and resuscitated, hospitalized with reportedly normal Cardiac cath, left AMA and came to [place] for further care, presented today with palpitations while at work sitting at her desk with heart rates recorded on her apple watch up to 185bpm.     --pt without cp, or anginal symptoms   --echo as noted  --f/u cardiac MRI  - pending for tomorrow   --hx VF arrest, , EP f/u appreciated   --awaiting review OP records   --further reccs based on above  --on d/c pt to follow with Dr. Hines and Dr. Arce

## 2020-01-21 LAB
ANION GAP SERPL CALC-SCNC: 9 MMO/L — SIGNIFICANT CHANGE UP (ref 7–14)
BUN SERPL-MCNC: 15 MG/DL — SIGNIFICANT CHANGE UP (ref 7–23)
CALCIUM SERPL-MCNC: 9.4 MG/DL — SIGNIFICANT CHANGE UP (ref 8.4–10.5)
CHLORIDE SERPL-SCNC: 104 MMOL/L — SIGNIFICANT CHANGE UP (ref 98–107)
CO2 SERPL-SCNC: 25 MMOL/L — SIGNIFICANT CHANGE UP (ref 22–31)
CREAT SERPL-MCNC: 0.72 MG/DL — SIGNIFICANT CHANGE UP (ref 0.5–1.3)
GLUCOSE BLDC GLUCOMTR-MCNC: 134 MG/DL — HIGH (ref 70–99)
GLUCOSE BLDC GLUCOMTR-MCNC: 142 MG/DL — HIGH (ref 70–99)
GLUCOSE BLDC GLUCOMTR-MCNC: 146 MG/DL — HIGH (ref 70–99)
GLUCOSE SERPL-MCNC: 130 MG/DL — HIGH (ref 70–99)
HCT VFR BLD CALC: 34.5 % — SIGNIFICANT CHANGE UP (ref 34.5–45)
HGB BLD-MCNC: 10.1 G/DL — LOW (ref 11.5–15.5)
MAGNESIUM SERPL-MCNC: 2 MG/DL — SIGNIFICANT CHANGE UP (ref 1.6–2.6)
MCHC RBC-ENTMCNC: 21.6 PG — LOW (ref 27–34)
MCHC RBC-ENTMCNC: 29.3 % — LOW (ref 32–36)
MCV RBC AUTO: 73.7 FL — LOW (ref 80–100)
NRBC # FLD: 0 K/UL — SIGNIFICANT CHANGE UP (ref 0–0)
PHOSPHATE SERPL-MCNC: 3.3 MG/DL — SIGNIFICANT CHANGE UP (ref 2.5–4.5)
PLATELET # BLD AUTO: 314 K/UL — SIGNIFICANT CHANGE UP (ref 150–400)
PMV BLD: 10.7 FL — SIGNIFICANT CHANGE UP (ref 7–13)
POTASSIUM SERPL-MCNC: 4.4 MMOL/L — SIGNIFICANT CHANGE UP (ref 3.5–5.3)
POTASSIUM SERPL-SCNC: 4.4 MMOL/L — SIGNIFICANT CHANGE UP (ref 3.5–5.3)
RBC # BLD: 4.68 M/UL — SIGNIFICANT CHANGE UP (ref 3.8–5.2)
RBC # FLD: 17.1 % — HIGH (ref 10.3–14.5)
SODIUM SERPL-SCNC: 138 MMOL/L — SIGNIFICANT CHANGE UP (ref 135–145)
WBC # BLD: 3.14 K/UL — LOW (ref 3.8–10.5)
WBC # FLD AUTO: 3.14 K/UL — LOW (ref 3.8–10.5)

## 2020-01-21 PROCEDURE — 75561 CARDIAC MRI FOR MORPH W/DYE: CPT | Mod: 26

## 2020-01-21 RX ADMIN — HEPARIN SODIUM 5000 UNIT(S): 5000 INJECTION INTRAVENOUS; SUBCUTANEOUS at 06:03

## 2020-01-21 RX ADMIN — Medication 2 MILLIGRAM(S): at 10:55

## 2020-01-21 NOTE — PROGRESS NOTE ADULT - SUBJECTIVE AND OBJECTIVE BOX
Subjective: No chest pain or sob; ROS otherwise negative.       dextrose 40% Gel 15 Gram(s) Oral once PRN  dextrose 5%. 1000 milliLiter(s) IV Continuous <Continuous>  dextrose 50% Injectable 12.5 Gram(s) IV Push once  dextrose 50% Injectable 25 Gram(s) IV Push once  dextrose 50% Injectable 25 Gram(s) IV Push once  glucagon  Injectable 1 milliGRAM(s) IntraMuscular once PRN  heparin  Injectable 5000 Unit(s) SubCutaneous every 8 hours  insulin lispro (HumaLOG) corrective regimen sliding scale   SubCutaneous three times a day before meals  lisinopril 5 milliGRAM(s) Oral daily  pantoprazole    Tablet 40 milliGRAM(s) Oral before breakfast                            10.1   3.14  )-----------( 314      ( 21 Jan 2020 07:14 )             34.5       01-21    138  |  104  |  15  ----------------------------<  130<H>  4.4   |  25  |  0.72    Ca    9.4      21 Jan 2020 07:14  Phos  3.3     01-21  Mg     2.0     01-21              T(C): 36.3 (01-21-20 @ 15:17), Max: 36.8 (01-20-20 @ 22:27)  HR: 70 (01-21-20 @ 15:17) (70 - 78)  BP: 130/79 (01-21-20 @ 15:17) (104/53 - 130/79)  RR: 15 (01-21-20 @ 15:17) (15 - 17)  SpO2: 100% (01-21-20 @ 15:17) (99% - 100%)  Wt(kg): --    I&O's Summary      Appearance: Normal	  HEENT:   Normal oral mucosa, PERRL, EOMI	  Lymphatic: No lymphadenopathy , no edema  Cardiovascular: Normal S1 S2, No JVD, No murmurs , Peripheral pulses palpable 2+ bilaterally  Respiratory: Lungs clear to auscultation, normal effort 	  Gastrointestinal:  Soft, Non-tender, + BS	  Skin: No rashes, No ecchymoses, No cyanosis, warm to touch  Musculoskeletal: Normal range of motion, normal strength  Psychiatry:  Mood & affect appropriate    TELEMETRY: SR	        DIAGNOSTIC  < from: Transthoracic Echocardiogram (01.16.20 @ 16:35) >  OBSERVATIONS:  Mitral Valve: Normal mitral valve. Minimal mitral  regurgitation.  Aortic Root: Normal aortic root.  Aortic Valve: Normal trileaflet aortic valve.  Left Atrium: Normal left atrium.  Left Ventricle: Endocardium not well visualized; grossly  normal left ventricular systolic function. Normal left  ventricular internal dimensions and wall thicknesses.  Right Heart: Normal right atrium. The right ventricle is  not well visualized; grossly normal right ventricular  systolic function. Normal tricuspid valve. Minimal  tricuspid regurgitation. Normal pulmonic valve.  Pericardium/PleuraNormal pericardium with no pericardial  effusion.  ------------------------------------------------------------------------  CONCLUSIONS:  1. Normal mitral valve. Minimal mitral regurgitation.  2. Normal left ventricular internal dimensions and wall  thicknesses.  3. Endocardium not well visualized; grossly normal left  ventricular systolic function.  4. The right ventricle is not well visualized; grossly  normal right ventricular systolic function.    < end of copied text >      ASSESSMENT/PLAN: 	54y Female with history of DM, history of cardiac arrest this month in [place] admitted with palpitations.     -Monitor on tele to rule out malignant arrhythmias  -Follow up cath report from outside hospital   -EP follow up appreciated  -TTE with normal BI-V function no valve disease   -Follow up cardiac MRI  -Further interventional workup pending above    Allison Dennis MD

## 2020-01-21 NOTE — PROGRESS NOTE ADULT - SUBJECTIVE AND OBJECTIVE BOX
Patient denies chest pain or shortness of breath.   Review of systems otherwise (-)        MEDICATIONS  (STANDING):  dextrose 5%. 1000 milliLiter(s) (50 mL/Hr) IV Continuous <Continuous>  dextrose 50% Injectable 12.5 Gram(s) IV Push once  dextrose 50% Injectable 25 Gram(s) IV Push once  dextrose 50% Injectable 25 Gram(s) IV Push once  heparin  Injectable 5000 Unit(s) SubCutaneous every 8 hours  insulin lispro (HumaLOG) corrective regimen sliding scale   SubCutaneous three times a day before meals  lisinopril 5 milliGRAM(s) Oral daily  pantoprazole    Tablet 40 milliGRAM(s) Oral before breakfast    MEDICATIONS  (PRN):  dextrose 40% Gel 15 Gram(s) Oral once PRN Blood Glucose LESS THAN 70 milliGRAM(s)/deciliter  glucagon  Injectable 1 milliGRAM(s) IntraMuscular once PRN Glucose LESS THAN 70 milligrams/deciliter      LABS:                            10.1   3.14  )-----------( 314      ( 21 Jan 2020 07:14 )             34.5     Hemoglobin: 10.1 g/dL (01-21 @ 07:14)  Hemoglobin: 10.7 g/dL (01-20 @ 06:23)  Hemoglobin: 10.8 g/dL (01-19 @ 06:07)  Hemoglobin: 10.7 g/dL (01-18 @ 06:50)  Hemoglobin: 10.5 g/dL (01-17 @ 06:00)    01-21    138  |  104  |  15  ----------------------------<  130<H>  4.4   |  25  |  0.72    Ca    9.4      21 Jan 2020 07:14  Phos  3.3     01-21  Mg     2.0     01-21      Creatinine Trend: 0.72<--, 0.70<--, 0.70<--, 0.69<--, 0.69<--, 0.68<--           PHYSICAL EXAM  Vital Signs Last 24 Hrs  T(C): 36.7 (21 Jan 2020 06:00), Max: 36.8 (20 Jan 2020 22:27)  T(F): 98.1 (21 Jan 2020 06:00), Max: 98.2 (20 Jan 2020 22:27)  HR: 78 (21 Jan 2020 06:00) (71 - 78)  BP: 110/65 (21 Jan 2020 06:00) (104/53 - 110/65)  BP(mean): --  RR: 17 (21 Jan 2020 06:00) (17 - 17)  SpO2: 100% (21 Jan 2020 06:00) (98% - 100%)        Gen: Appears well in NAD  HEENT:  (-)icterus (-)pallor  CV: N S1 S2 1/6 CORTNEY (+)2 Pulses B/l  Resp:  Clear to auscultation B/L, normal effort  GI: (+) BS Soft, NT, ND  Lymph:  (-)Edema, (-)obvious lymphadenopathy  Skin: Warm to touch, Normal turgor  Psych: Appropriate mood and affect    TELEMETRY: 	SR, no events     DIAGNOSTICS:    < from: Transthoracic Echocardiogram (01.16.20 @ 16:35) >  CONCLUSIONS:  1. Normal mitral valve. Minimal mitral regurgitation.  2. Normal left ventricular internal dimensions and wall  thicknesses.  3. Endocardium not well visualized; grossly normal left  ventricular systolic function.  4. The right ventricle is not well visualized; grossly  normal right ventricular systolic function.  ------------------------------------------------------------------------  Confirmed on  1/16/2020 - 17:54:57 by Lawrence Ness M.D.        ASSESSMENT/PLAN: 	    54 year old female PMH DM, and 1 week ago while in [place] patient suffered a reported VF arrest, was shocked and resuscitated, hospitalized with reportedly normal Cardiac cath, left AMA and came to [place] for further care, presented today with palpitations while at work sitting at her desk with heart rates recorded on her apple watch up to 185bpm.     --pt without cp, or anginal symptoms   --echo as noted   --f/u cardiac MRI  - pending today with [facility] -d/w MRI staff  --hx VF arrest, , EP f/u appreciated   --cath report pending from [facility]   --further reccs based on above  --on d/c pt to follow with Dr. Hines and Dr. Arce

## 2020-01-21 NOTE — PROGRESS NOTE ADULT - SUBJECTIVE AND OBJECTIVE BOX
EP ATTENDING    tele: NSR, no events    she denies palpitations, syncope, nor angina, ROS otherwise -    dextrose 40% Gel 15 Gram(s) Oral once PRN  dextrose 5%. 1000 milliLiter(s) IV Continuous <Continuous>  dextrose 50% Injectable 12.5 Gram(s) IV Push once  dextrose 50% Injectable 25 Gram(s) IV Push once  dextrose 50% Injectable 25 Gram(s) IV Push once  glucagon  Injectable 1 milliGRAM(s) IntraMuscular once PRN  heparin  Injectable 5000 Unit(s) SubCutaneous every 8 hours  insulin lispro (HumaLOG) corrective regimen sliding scale   SubCutaneous three times a day before meals  lisinopril 5 milliGRAM(s) Oral daily  pantoprazole    Tablet 40 milliGRAM(s) Oral before breakfast                            10.1   3.14  )-----------( 314      ( 21 Jan 2020 07:14 )             34.5       01-21    138  |  104  |  15  ----------------------------<  130<H>  4.4   |  25  |  0.72    Ca    9.4      21 Jan 2020 07:14  Phos  3.3     01-21  Mg     2.0     01-21    T(C): 36.7 (01-21-20 @ 06:00), Max: 36.8 (01-20-20 @ 22:27)  HR: 78 (01-21-20 @ 06:00) (71 - 78)  BP: 110/65 (01-21-20 @ 06:00) (104/53 - 110/65)  RR: 17 (01-21-20 @ 06:00) (17 - 17)  SpO2: 100% (01-21-20 @ 06:00) (98% - 100%)  Wt(kg): --    A&O x 3  neurologically intact  no JVD  RRR, no murmurs  CTAB  soft nt/nd  no c/c/e    cath at [facility]: unremarkable  cardiac MRI pending  echo: normal LVEFF      A/P) She is a pleasant 55 y/o female PMH DM and reported PAF who had a documented VF arrest while in [facility]. A cath was normal, and then she left AMA to pursue a further workup here in [place]. Echo here is unremarkable. Her baseline EKG has subtle late notching in the inferior leads - her signs and symptoms are highly concerning for an infiltrative cardiomyopathy (not a channelopathy) such as ARVD or cardiac sarcoid or amyloid. Cardiac MRI pending, but I am highly suspect of ARVD as the underlying diagnosis    -get cardiac MRI w/wo contrast here at [facility]  -if cardiac MRI consistent with sarcoid, amyloid or ARVD then will recommend a dual chamber ICD for the secondary prevention of sudden death  -if her cardiac MRI is normal, then I would recommend an EP study (to rule out a latent AP), followed by dual chamber ICD if she is not found to have an accessory pathway   -f/u with [facility] after discharge  -f/u with me after discharge and inpatient dual chamber ICD implant ([employer])  -f/u with her PMD Iggy Carbone at [facility] after discharge

## 2020-01-21 NOTE — PROGRESS NOTE ADULT - SUBJECTIVE AND OBJECTIVE BOX
Patient is a 54y old  Female who presents with a chief complaint of cardiac work up (20 Jan 2020 13:20)      SUBJECTIVE / OVERNIGHT EVENTS: overnight events noted    ROS:  Resp: No cough no sputum production  CVS: No chest pain no palpitations no orthopnea  GI: no N/V/D  : no dysuria, no hematuria  Neuro: no weakness no paresthesias  Heme: No petechiae no easy bruising  Msk: No joint pain no swelling  Skin: No rash no itching        MEDICATIONS  (STANDING):  dextrose 5%. 1000 milliLiter(s) (50 mL/Hr) IV Continuous <Continuous>  dextrose 50% Injectable 12.5 Gram(s) IV Push once  dextrose 50% Injectable 25 Gram(s) IV Push once  dextrose 50% Injectable 25 Gram(s) IV Push once  heparin  Injectable 5000 Unit(s) SubCutaneous every 8 hours  insulin lispro (HumaLOG) corrective regimen sliding scale   SubCutaneous three times a day before meals  lisinopril 5 milliGRAM(s) Oral daily  pantoprazole    Tablet 40 milliGRAM(s) Oral before breakfast    MEDICATIONS  (PRN):  dextrose 40% Gel 15 Gram(s) Oral once PRN Blood Glucose LESS THAN 70 milliGRAM(s)/deciliter  glucagon  Injectable 1 milliGRAM(s) IntraMuscular once PRN Glucose LESS THAN 70 milligrams/deciliter        CAPILLARY BLOOD GLUCOSE      POCT Blood Glucose.: 146 mg/dL (21 Jan 2020 08:35)  POCT Blood Glucose.: 143 mg/dL (20 Jan 2020 21:09)  POCT Blood Glucose.: 242 mg/dL (20 Jan 2020 17:34)    I&O's Summary      Vital Signs Last 24 Hrs  T(C): 36.3 (21 Jan 2020 15:17), Max: 36.8 (20 Jan 2020 22:27)  T(F): 97.3 (21 Jan 2020 15:17), Max: 98.2 (20 Jan 2020 22:27)  HR: 70 (21 Jan 2020 15:17) (70 - 78)  BP: 130/79 (21 Jan 2020 15:17) (104/53 - 130/79)  BP(mean): --  RR: 15 (21 Jan 2020 15:17) (15 - 17)  SpO2: 100% (21 Jan 2020 15:17) (99% - 100%)    PHYSICAL EXAM:  GENERAL: in no apparent distress  HEAD:  Atraumatic, Normocephalic  EYES: EOMI, PERRLA, conjunctiva and sclera clear  NECK: Supple, No JVD  CHEST/LUNG: no wheeze, clear to auscultation bilaterally  HEART: S1 S2; No rubs or gallops, no murmurs appreciated  ABDOMEN: Soft, Nontender, Nondistended; Bowel sounds present  EXTREMITIES:  No clubbing or cyanosis, + Peripheral Pulses,  no edema  PSYCH: AO x 3 appropriate affect  NEUROLOGY: non-focal, motor and sensory systems intact  SKIN: No rashes or lesions    LABS:                        10.1   3.14  )-----------( 314      ( 21 Jan 2020 07:14 )             34.5     01-21    138  |  104  |  15  ----------------------------<  130<H>  4.4   |  25  |  0.72    Ca    9.4      21 Jan 2020 07:14  Phos  3.3     01-21  Mg     2.0     01-21                  All consultant(s) notes reviewed and care discussed with other providers        Contact Number, Dr Hsieh [phone]

## 2020-01-21 NOTE — PROGRESS NOTE ADULT - PROBLEM SELECTOR PLAN 3
work up as per cardiology attending  echocardiogram normal  MRI cardiac noted   defer to EP and cardiology attending

## 2020-01-21 NOTE — PROGRESS NOTE ADULT - ATTENDING COMMENTS
Agree with above assessment and plan as outlined above.    - MRI noted  - EP f/u for potential ICD    Felix Grover MD, [facility]  BEEPER [phone]

## 2020-01-22 LAB
ANION GAP SERPL CALC-SCNC: 10 MMO/L — SIGNIFICANT CHANGE UP (ref 7–14)
ANION GAP SERPL CALC-SCNC: 10 MMO/L — SIGNIFICANT CHANGE UP (ref 7–14)
APTT BLD: 29.1 SEC — SIGNIFICANT CHANGE UP (ref 27.5–36.3)
BLD GP AB SCN SERPL QL: NEGATIVE — SIGNIFICANT CHANGE UP
BUN SERPL-MCNC: 14 MG/DL — SIGNIFICANT CHANGE UP (ref 7–23)
BUN SERPL-MCNC: 14 MG/DL — SIGNIFICANT CHANGE UP (ref 7–23)
CALCIUM SERPL-MCNC: 9.6 MG/DL — SIGNIFICANT CHANGE UP (ref 8.4–10.5)
CALCIUM SERPL-MCNC: 9.6 MG/DL — SIGNIFICANT CHANGE UP (ref 8.4–10.5)
CHLORIDE SERPL-SCNC: 103 MMOL/L — SIGNIFICANT CHANGE UP (ref 98–107)
CHLORIDE SERPL-SCNC: 103 MMOL/L — SIGNIFICANT CHANGE UP (ref 98–107)
CO2 SERPL-SCNC: 24 MMOL/L — SIGNIFICANT CHANGE UP (ref 22–31)
CO2 SERPL-SCNC: 24 MMOL/L — SIGNIFICANT CHANGE UP (ref 22–31)
CREAT SERPL-MCNC: 0.75 MG/DL — SIGNIFICANT CHANGE UP (ref 0.5–1.3)
CREAT SERPL-MCNC: 0.75 MG/DL — SIGNIFICANT CHANGE UP (ref 0.5–1.3)
GLUCOSE BLDC GLUCOMTR-MCNC: 121 MG/DL — HIGH (ref 70–99)
GLUCOSE BLDC GLUCOMTR-MCNC: 139 MG/DL — HIGH (ref 70–99)
GLUCOSE BLDC GLUCOMTR-MCNC: 155 MG/DL — HIGH (ref 70–99)
GLUCOSE BLDC GLUCOMTR-MCNC: 217 MG/DL — HIGH (ref 70–99)
GLUCOSE SERPL-MCNC: 142 MG/DL — HIGH (ref 70–99)
GLUCOSE SERPL-MCNC: 142 MG/DL — HIGH (ref 70–99)
HCT VFR BLD CALC: 36.3 % — SIGNIFICANT CHANGE UP (ref 34.5–45)
HGB BLD-MCNC: 10.5 G/DL — LOW (ref 11.5–15.5)
INR BLD: 1.02 — SIGNIFICANT CHANGE UP (ref 0.88–1.17)
MAGNESIUM SERPL-MCNC: 1.9 MG/DL — SIGNIFICANT CHANGE UP (ref 1.6–2.6)
MCHC RBC-ENTMCNC: 21.5 PG — LOW (ref 27–34)
MCHC RBC-ENTMCNC: 28.9 % — LOW (ref 32–36)
MCV RBC AUTO: 74.2 FL — LOW (ref 80–100)
NRBC # FLD: 0 K/UL — SIGNIFICANT CHANGE UP (ref 0–0)
PHOSPHATE SERPL-MCNC: 3.2 MG/DL — SIGNIFICANT CHANGE UP (ref 2.5–4.5)
PLATELET # BLD AUTO: 334 K/UL — SIGNIFICANT CHANGE UP (ref 150–400)
PMV BLD: 10.4 FL — SIGNIFICANT CHANGE UP (ref 7–13)
POTASSIUM SERPL-MCNC: 4.5 MMOL/L — SIGNIFICANT CHANGE UP (ref 3.5–5.3)
POTASSIUM SERPL-MCNC: 4.5 MMOL/L — SIGNIFICANT CHANGE UP (ref 3.5–5.3)
POTASSIUM SERPL-SCNC: 4.5 MMOL/L — SIGNIFICANT CHANGE UP (ref 3.5–5.3)
POTASSIUM SERPL-SCNC: 4.5 MMOL/L — SIGNIFICANT CHANGE UP (ref 3.5–5.3)
PROTHROM AB SERPL-ACNC: 11.6 SEC — SIGNIFICANT CHANGE UP (ref 9.8–13.1)
RBC # BLD: 4.89 M/UL — SIGNIFICANT CHANGE UP (ref 3.8–5.2)
RBC # FLD: 17 % — HIGH (ref 10.3–14.5)
RH IG SCN BLD-IMP: NEGATIVE — SIGNIFICANT CHANGE UP
SODIUM SERPL-SCNC: 137 MMOL/L — SIGNIFICANT CHANGE UP (ref 135–145)
SODIUM SERPL-SCNC: 137 MMOL/L — SIGNIFICANT CHANGE UP (ref 135–145)
WBC # BLD: 3.23 K/UL — LOW (ref 3.8–10.5)
WBC # FLD AUTO: 3.23 K/UL — LOW (ref 3.8–10.5)

## 2020-01-22 PROCEDURE — 74176 CT ABD & PELVIS W/O CONTRAST: CPT | Mod: 26

## 2020-01-22 PROCEDURE — 99223 1ST HOSP IP/OBS HIGH 75: CPT | Mod: GC

## 2020-01-22 PROCEDURE — 71250 CT THORAX DX C-: CPT | Mod: 26

## 2020-01-22 RX ADMIN — PANTOPRAZOLE SODIUM 40 MILLIGRAM(S): 20 TABLET, DELAYED RELEASE ORAL at 13:02

## 2020-01-22 RX ADMIN — Medication 2: at 17:48

## 2020-01-22 RX ADMIN — HEPARIN SODIUM 5000 UNIT(S): 5000 INJECTION INTRAVENOUS; SUBCUTANEOUS at 13:02

## 2020-01-22 RX ADMIN — HEPARIN SODIUM 5000 UNIT(S): 5000 INJECTION INTRAVENOUS; SUBCUTANEOUS at 21:21

## 2020-01-22 NOTE — PROGRESS NOTE ADULT - SUBJECTIVE AND OBJECTIVE BOX
Subjective: No chest pain or sob; ROS otherwise negative.     dextrose 40% Gel 15 Gram(s) Oral once PRN  dextrose 5%. 1000 milliLiter(s) IV Continuous <Continuous>  dextrose 50% Injectable 12.5 Gram(s) IV Push once  dextrose 50% Injectable 25 Gram(s) IV Push once  dextrose 50% Injectable 25 Gram(s) IV Push once  glucagon  Injectable 1 milliGRAM(s) IntraMuscular once PRN  heparin  Injectable 5000 Unit(s) SubCutaneous every 8 hours  insulin lispro (HumaLOG) corrective regimen sliding scale   SubCutaneous three times a day before meals  lisinopril 5 milliGRAM(s) Oral daily  pantoprazole    Tablet 40 milliGRAM(s) Oral before breakfast                            10.5   3.23  )-----------( 334      ( 22 Jan 2020 06:39 )             36.3       01-22    137  |  103  |  14  ----------------------------<  142<H>  4.5   |  24  |  0.75    Ca    9.6      22 Jan 2020 06:39  Phos  3.2     01-22  Mg     1.9     01-22              T(C): 36.4 (01-22-20 @ 11:46), Max: 36.8 (01-21-20 @ 21:30)  HR: 94 (01-22-20 @ 11:46) (70 - 94)  BP: 128/77 (01-22-20 @ 11:46) (104/68 - 130/79)  RR: 18 (01-22-20 @ 11:46) (15 - 18)  SpO2: 100% (01-22-20 @ 11:46) (99% - 100%)  Wt(kg): --    I&O's Summary        Appearance: Normal	  HEENT:   Normal oral mucosa, PERRL, EOMI	  Lymphatic: No lymphadenopathy , no edema  Cardiovascular: Normal S1 S2, No JVD, No murmurs , Peripheral pulses palpable 2+ bilaterally  Respiratory: Lungs clear to auscultation, normal effort 	  Gastrointestinal:  Soft, Non-tender, + BS	  Skin: No rashes, No ecchymoses, No cyanosis, warm to touch  Musculoskeletal: Normal range of motion, normal strength  Psychiatry:  Mood & affect appropriate    TELEMETRY: SR	        DIAGNOSTIC  < from: Transthoracic Echocardiogram (01.16.20 @ 16:35) >  OBSERVATIONS:  Mitral Valve: Normal mitral valve. Minimal mitral  regurgitation.  Aortic Root: Normal aortic root.  Aortic Valve: Normal trileaflet aortic valve.  Left Atrium: Normal left atrium.  Left Ventricle: Endocardium not well visualized; grossly  normal left ventricular systolic function. Normal left  ventricular internal dimensions and wall thicknesses.  Right Heart: Normal right atrium. The right ventricle is  not well visualized; grossly normal right ventricular  systolic function. Normal tricuspid valve. Minimal  tricuspid regurgitation. Normal pulmonic valve.  Pericardium/PleuraNormal pericardium with no pericardial  effusion.  ------------------------------------------------------------------------  CONCLUSIONS:  1. Normal mitral valve. Minimal mitral regurgitation.  2. Normal left ventricular internal dimensions and wall  thicknesses.  3. Endocardium not well visualized; grossly normal left  ventricular systolic function.  4. The right ventricle is not well visualized; grossly  normal right ventricular systolic function.    < end of copied text >      ASSESSMENT/PLAN: 	54y Female with history of DM, history of cardiac arrest this month in [place] admitted with palpitations.     -Monitor on tele to rule out malignant arrhythmias  -No significant CAD seen at outside hospital cath per their records   -EP follow up appreciated - AICD for secondary prevention   -TTE with normal BI-V function no valve disease   -Rheum and pulmonary eval for sarcoid workup  -Check non con ct c/a/p    Allison Dennis MD

## 2020-01-22 NOTE — CONSULT NOTE ADULT - ATTENDING COMMENTS
Agree with above assessment and plan as outlined above.    - cont tele  - Echo  - ? borderline QT on EKG, EP eval Dr. Yazmin Grover MD, [facility]  BEEPER [phone]
EP ATTENDING    Patient seen and examined. Agree with above. 55 y/o female PMH DM who had a documented VF arrest while in [facility]. A cath was reportedly normal, and then she left AMA to pursue a further workup here in [place]. Echo here is unremarkable. Her baseline EKG has subtle late notching in the inferior leads - her signs and symptoms are highly concerning for an infiltrative cardiomyopathy (not a channelopathy) such as ARVD or cardiac sarcoid.    Plan  -get cath report from [facility]  -get cardiac MRI w/wo contrast here at [facility]  -if cardiac MRI consistent with sarcoid, amyloid or ARVD then will recommend a dual chamber ICD for the secondary prevention of sudden death  -if her cardiac MRI is normal, then I would recommend an EP study (for completeness to rule out a latent AP), followed by dual chamber ICD if she is not found to have an accessory pathway
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 Rheumatology consulted for evaluation of sarcoidosis. Patient with no obvious extracardiac manifestations of sarcoidosis, such as pulmonary, neurologic, lacrimal or cutaneous involvement.   Pt with ventricular tachycardia likely related to underlying infiltrative process. Most common cardiac abnormality in sarcoidosis is AV block, however can also lead to SVT and VT.   Reviewed imaging with radiology - scarring is non-specific and can be related to any infiltrative process. Rheumatologic conditions that can lead to infiltrative cardiac process include sarcoidosis, GPA, PAN, granulomatous myocarditis, HSP, RA, SLE.    Recommend to send all pertinent serologies and labs, as noted above.   Will discuss working diagnosis with cardiology. If there is concern for cardiac sarcoidosis, "gold standard" for diagnosis is endomysial biopsy. However, due to size of lesion as well as location, this is likely to be of low yield (per discussion with radiology). Therefore, if cardiac sarcoidosis is a concern, would need FDG-PET to assess for activity as well as to determine extent of systemic involvement, which will determine if treatment with steroids is warranted.  Ophto eval.  Will follow up workup.    Dr. Janeen Dowling  [phone]

## 2020-01-22 NOTE — PROGRESS NOTE ADULT - SUBJECTIVE AND OBJECTIVE BOX
Patient denies chest pain or shortness of breath.   Review of systems otherwise (-)    MEDICATIONS  (STANDING):  heparin  Injectable 5000 Unit(s) SubCutaneous every 8 hours  insulin lispro (HumaLOG) corrective regimen sliding scale   SubCutaneous three times a day before meals  lisinopril 5 milliGRAM(s) Oral daily  pantoprazole    Tablet 40 milliGRAM(s) Oral before breakfast    MEDICATIONS  (PRN):  dextrose 40% Gel 15 Gram(s) Oral once PRN Blood Glucose LESS THAN 70 milliGRAM(s)/deciliter  glucagon  Injectable 1 milliGRAM(s) IntraMuscular once PRN Glucose LESS THAN 70 milligrams/deciliter      LABS:                      10.5   3.23  )-----------( 334      ( 22 Jan 2020 06:39 )             36.3     137  |  103  |  14  ----------------------------<  142<H>  4.5   |  24  |  0.75    Ca    9.6      22 Jan 2020 06:39  Phos  3.2     01-22  Mg     1.9     01-22    Creatinine Trend: 0.75<--, 0.72<--, 0.70<--, 0.70<--, 0.69<--, 0.69<--   PT/INR - ( 22 Jan 2020 06:39 )   PT: 11.6 SEC;   INR: 1.02     PTT - ( 22 Jan 2020 06:39 )  PTT:29.1 SEC    PHYSICAL EXAM  Vital Signs Last 24 Hrs  T(C): 36.4 (22 Jan 2020 11:46), Max: 36.8 (21 Jan 2020 21:30)  T(F): 97.5 (22 Jan 2020 11:46), Max: 98.2 (21 Jan 2020 21:30)  HR: 94 (22 Jan 2020 11:46) (70 - 94)  BP: 128/77 (22 Jan 2020 11:46) (104/68 - 130/79)  RR: 18 (22 Jan 2020 11:46) (15 - 18)  SpO2: 100% (22 Jan 2020 11:46) (99% - 100%)    Gen: Appears well in NAD  HEENT:  (-)icterus (-)pallor  CV: N S1 S2 1/6 CORTNEY (+)2 Pulses B/l  Resp:  Clear to auscultation B/L, normal effort  GI: (+) BS Soft, NT, ND  Lymph:  (-)Edema, (-)obvious lymphadenopathy  Skin: Warm to touch, Normal turgor  Psych: Appropriate mood and affect    TELEMETRY: 	SR, no events     DIAGNOSTICS:    < from: Transthoracic Echocardiogram (01.16.20 @ 16:35) >  CONCLUSIONS:  1. Normal mitral valve. Minimal mitral regurgitation.  2. Normal left ventricular internal dimensions and wall  thicknesses.  3. Endocardium not well visualized; grossly normal left  ventricular systolic function.  4. The right ventricle is not well visualized; grossly  normal right ventricular systolic function.  ------------------------------------------------------------------------  Confirmed on  1/16/2020 - 17:54:57 by Lawrence Ness M.D.    < from: MR Cardiac w/wo IV Cont (01.21.20 @ 14:08) >  IMPRESSION:     1.  The LV is normal in size with qualitatively normal systolic function.    2.  Patchy subepicardial late gadolinium enhancement within the inferoseptum at the mid-level to apex which can represent scar or fibrosis secondary to nonischemic cardiomyopathy such as sarcoidosis. No evidence of ARVD or amyloidosis.  < end of copied text >      ASSESSMENT/PLAN: 	    54 year old female PMH DM, and 1 week ago while in [place] patient suffered a reported VF arrest, was shocked and resuscitated, hospitalized with reportedly normal Cardiac cath, left AMA and came to [place] for further care, presented today with palpitations while at work sitting at her desk with heart rates recorded on her apple watch up to 185bpm.     --pt without cp, or anginal symptoms   --echo as noted   --cardiac MRI noted above  --given suspicion for cardiac sarcoid, consult Pulm and Rheum   --hx VF arrest, , EP f/u appreciated , plan for ICD Friday  --on d/c pt to follow with Dr. Hines and Dr. Arce

## 2020-01-22 NOTE — PROGRESS NOTE ADULT - SUBJECTIVE AND OBJECTIVE BOX
Patient is a 54y old  Female who presents with a chief complaint of Palpitations and dizziness (22 Jan 2020 12:15)      SUBJECTIVE / OVERNIGHT EVENTS: overnight events noted    ROS:  Resp: No cough no sputum production  CVS: No chest pain no palpitations no orthopnea  GI: no N/V/D  : no dysuria, no hematuria  Neuro: no weakness no paresthesias  Heme: No petechiae no easy bruising  Msk: No joint pain no swelling  Skin: No rash no itching        MEDICATIONS  (STANDING):  dextrose 5%. 1000 milliLiter(s) (50 mL/Hr) IV Continuous <Continuous>  dextrose 50% Injectable 12.5 Gram(s) IV Push once  dextrose 50% Injectable 25 Gram(s) IV Push once  dextrose 50% Injectable 25 Gram(s) IV Push once  heparin  Injectable 5000 Unit(s) SubCutaneous every 8 hours  insulin lispro (HumaLOG) corrective regimen sliding scale   SubCutaneous three times a day before meals  lisinopril 5 milliGRAM(s) Oral daily  pantoprazole    Tablet 40 milliGRAM(s) Oral before breakfast    MEDICATIONS  (PRN):  dextrose 40% Gel 15 Gram(s) Oral once PRN Blood Glucose LESS THAN 70 milliGRAM(s)/deciliter  glucagon  Injectable 1 milliGRAM(s) IntraMuscular once PRN Glucose LESS THAN 70 milligrams/deciliter        CAPILLARY BLOOD GLUCOSE      POCT Blood Glucose.: 121 mg/dL (22 Jan 2020 12:44)  POCT Blood Glucose.: 139 mg/dL (22 Jan 2020 07:13)  POCT Blood Glucose.: 134 mg/dL (21 Jan 2020 21:39)  POCT Blood Glucose.: 142 mg/dL (21 Jan 2020 17:32)    I&O's Summary      Vital Signs Last 24 Hrs  T(C): 36.4 (22 Jan 2020 11:46), Max: 36.8 (21 Jan 2020 21:30)  T(F): 97.5 (22 Jan 2020 11:46), Max: 98.2 (21 Jan 2020 21:30)  HR: 94 (22 Jan 2020 11:46) (70 - 94)  BP: 128/77 (22 Jan 2020 11:46) (104/68 - 130/79)  BP(mean): --  RR: 18 (22 Jan 2020 11:46) (15 - 18)  SpO2: 100% (22 Jan 2020 11:46) (99% - 100%)    PHYSICAL EXAM:  GENERAL: in no apparent distress  HEAD:  Atraumatic, Normocephalic  EYES: EOMI, PERRLA, conjunctiva and sclera clear  NECK: Supple, No JVD  CHEST/LUNG: no wheeze, clear to auscultation bilaterally  HEART: S1 S2; No rubs or gallops, no murmurs appreciated  ABDOMEN: Soft, Nontender, Nondistended; Bowel sounds present  EXTREMITIES:  No clubbing or cyanosis, + Peripheral Pulses,  no edema  PSYCH: AO x 3 appropriate affect  NEUROLOGY: non-focal, motor and sensory systems intact  SKIN: No rashes or lesions    LABS:                        10.5   3.23  )-----------( 334      ( 22 Jan 2020 06:39 )             36.3     01-22    137  |  103  |  14  ----------------------------<  142<H>  4.5   |  24  |  0.75    Ca    9.6      22 Jan 2020 06:39  Phos  3.2     01-22  Mg     1.9     01-22      PT/INR - ( 22 Jan 2020 06:39 )   PT: 11.6 SEC;   INR: 1.02          PTT - ( 22 Jan 2020 06:39 )  PTT:29.1 SEC            All consultant(s) notes reviewed and care discussed with other providers        Contact Number, Dr Hsieh [phone]

## 2020-01-22 NOTE — PROGRESS NOTE ADULT - SUBJECTIVE AND OBJECTIVE BOX
EP ATTENDING    tele: NSR, no events    she denies palpitations, syncope, nor angina, ROS otherwise -    dextrose 40% Gel 15 Gram(s) Oral once PRN  dextrose 5%. 1000 milliLiter(s) IV Continuous <Continuous>  dextrose 50% Injectable 12.5 Gram(s) IV Push once  dextrose 50% Injectable 25 Gram(s) IV Push once  dextrose 50% Injectable 25 Gram(s) IV Push once  glucagon  Injectable 1 milliGRAM(s) IntraMuscular once PRN  heparin  Injectable 5000 Unit(s) SubCutaneous every 8 hours  insulin lispro (HumaLOG) corrective regimen sliding scale   SubCutaneous three times a day before meals  lisinopril 5 milliGRAM(s) Oral daily  pantoprazole    Tablet 40 milliGRAM(s) Oral before breakfast                            10.5   3.23  )-----------( 334      ( 22 Jan 2020 06:39 )             36.3       01-22    137  |  103  |  14  ----------------------------<  142<H>  4.5   |  24  |  0.75    Ca    9.6      22 Jan 2020 06:39  Phos  3.2     01-22  Mg     1.9     01-22      T(C): 36.7 (01-22-20 @ 04:50), Max: 36.8 (01-21-20 @ 21:30)  HR: 72 (01-22-20 @ 04:50) (70 - 78)  BP: 115/83 (01-22-20 @ 04:50) (104/68 - 130/79)  RR: 18 (01-22-20 @ 04:50) (15 - 18)  SpO2: 99% (01-22-20 @ 04:50) (99% - 100%)  Wt(kg): --    A&O x 3  neurologically intact  no JVD  RRR, no murmurs  CTAB  soft nt/nd  no c/c/e    cath at [place]: unremarkable  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 A cath was normal, and then she left AMA to pursue a further workup here in [place]. Echo here is unremarkable. Her baseline EKG has subtle late notching in the inferior leads - her signs and symptoms are highly concerning for an infiltrative cardiomyopathy (not a channelopathy) such as ARVD or cardiac sarcoid or amyloid. Cardiac MRI is most consistent with cardiac sarcoid    -rhuem consult  -pulmonary consult  -CT chest without contrast screening for lymphadenopathy  -if CT chest shows lymphadenopathy amenable to biopsy, then a tissue diagnosis would be helpful  -regardless of the above workup I recommended a dual chamber ICD for the secondary prevention of sudden cardiac death. I cited a 3% risk of bleeding or infection, and a 1% risk of major complication including but not limited to heart attack, stroke, death or need for emergency open heart surgery or pericardiocentesis. Understanding their R/B/A she and her  elect dual chamber ICD implant  -NPO after midnight Thursday night for dual chamber ICD Friday afternoon (medtronic)  -I will wait until AF is documented on her atrial lead before recommending a NOAC  -f/u with me and [facility]ora after discharge  -f/u with her PMD Iggy Carbone at [facility] after discharge  -expect d/c home Saturday morning

## 2020-01-22 NOTE — CONSULT NOTE ADULT - PROBLEM SELECTOR RECOMMENDATION 9
The MRI is highly suggestive of cardiac sarcoid: Would do ct chest and ACE: she would need outpt PFT  ? biopsy ? site

## 2020-01-22 NOTE — CONSULT NOTE ADULT - SUBJECTIVE AND OBJECTIVE BOX
MARKELL GARCIAМАРИНА  [id number]    HISTORY OF PRESENT ILLNESS:        PAST MEDICAL & SURGICAL HISTORY:  Hypertension, unspecified type  Cardiac arrest: 2 week ago, defib 4x, brought to [facility] where she was found to have normal cors  Diabetes  History of tubal ligation  History of   History of sleeve gastrectomy      Review of Systems:  Gen:  No fevers/chills, weight loss  HEENT: No blurry vision, no difficulty swallowing, no oral or nasal ulcers  CVS: No chest pain/palpitations  Resp: No SOB/wheezing  GI: No N/V/C/D/abdominal pain  MSK:  Skin: No new rashes  Neuro: No headaches    MEDICATIONS  (STANDING):  dextrose 5%. 1000 milliLiter(s) (50 mL/Hr) IV Continuous <Continuous>  dextrose 50% Injectable 12.5 Gram(s) IV Push once  dextrose 50% Injectable 25 Gram(s) IV Push once  dextrose 50% Injectable 25 Gram(s) IV Push once  heparin  Injectable 5000 Unit(s) SubCutaneous every 8 hours  insulin lispro (HumaLOG) corrective regimen sliding scale   SubCutaneous three times a day before meals  lisinopril 5 milliGRAM(s) Oral daily  pantoprazole    Tablet 40 milliGRAM(s) Oral before breakfast    MEDICATIONS  (PRN):  dextrose 40% Gel 15 Gram(s) Oral once PRN Blood Glucose LESS THAN 70 milliGRAM(s)/deciliter  glucagon  Injectable 1 milliGRAM(s) IntraMuscular once PRN Glucose LESS THAN 70 milligrams/deciliter    Allergies  No Known Allergies  Intolerances    PERTINENT MEDICATION HISTORY:  SOCIAL HISTORY:  OCCUPATION:  TRAVEL HISTORY:  FAMILY HISTORY:    Vital Signs Last 24 Hrs  T(C): 36.4 (2020 11:46), Max: 36.8 (2020 21:30)  T(F): 97.5 (2020 11:46), Max: 98.2 (2020 21:30)  HR: 94 (2020 11:46) (70 - 94)  BP: 128/77 (2020 11:46) (104/68 - 130/79)  BP(mean): --  RR: 18 (2020 11:46) (15 - 18)  SpO2: 100% (2020 11:46) (99% - 100%)    Physical Exam:  General: No apparent distress  HEENT: EOMI, MMM  CVS: +S1/S2, RRR, no murmurs/rubs/gallops  Resp: CTA b/l. No crackles/wheezing  GI: Soft, NT/ND +BS  MSK:  Neuro: AAOx3  Skin: no visible rashes    LABS:                        10.5   3.23  )-----------( 334      ( 2020 06:39 )             36.3     01-22    137  |  103  |  14  ----------------------------<  142<H>  4.5   |  24  |  0.75    Ca    9.6      2020 06:39  Phos  3.2     01-22  Mg     1.9     01-22      PT/INR - ( 2020 06:39 )   PT: 11.6 SEC;   INR: 1.02          PTT - ( 2020 06:39 )  PTT:29.1 SEC      RADIOLOGY & ADDITIONAL STUDIES:      EXAM:  XR CHEST PA LAT 2V      PROCEDURE DATE:  Mp 15 2020     INTERPRETATION:  CLINICAL INFORMATION: Palpitations    TIME OF EXAMINATION: January 15, 2020 at 2:40 PM    EXAM: PA and Lateral Chest    FINDINGS:  Examination in frontal and lateral projection fails to show evidence of any active pulmonary disease.  The heart is not enlarged and there is no pleural effusion or pneumothorax.  There is no acute bone pathology.    COMPARISON: No prior exams available.      IMPRESSION: Normal chest      FRANTZ MELVIN M.D., ATTENDING RADIOLOGIST  This document has been electronically signed. Mp 15 2020  3:03PM                Patient name: MARKELL KUMAR  YOB: 1965   Age: 54 (F)   MR#: [id number]  Study Date: 2020  [facility]  Study quality: Technically Difficult  Referring Physician: Felix Grover MD  Blood Pressure: 118/80 mmHg  Height: 170 cm  Weight: 108 kg  BSA: 2.2 m2  ------------------------------------------------------------------------  PROCEDURE: Transthoracic echocardiogram with 2-D, M-Mode  and complete spectral and color flow Doppler.  INDICATION: Essential (primary) hypertension (I10)  ------------------------------------------------------------------------  DIMENSIONS:  Dimensions:     Normal Values:  LA:     3.1 cm    2.0 - 4.0 cm  Ao:     2.6 cm    2.0 - 3.8 cm  SEPTUM: 0.7 cm 0.6 - 1.2 cm  PWT:    0.7 cm    0.6 - 1.1 cm  LVIDd:  3.9 cm    3.0 - 5.6 cm  LVIDs:  2.6 cm    1.8 - 4.0 cm  Derived Variables:  LVMI: 34 g/m2  RWT: 0.35  Fractional short: 33 %  Ejection Fraction (Teicholtz): 63 %  ------------------------------------------------------------------------  OBSERVATIONS:  Mitral Valve: Normal mitral valve. Minimal mitral  regurgitation.  Aortic Root: Normal aortic root.  Aortic Valve: Normal trileaflet aortic valve.  Left Atrium: Normal left atrium.  Left Ventricle: Endocardium not well visualized; grossly  normal left ventricular systolic function. Normal left  ventricular internal dimensions and wall thicknesses.  Right Heart: Normal right atrium. The right ventricle is  not well visualized; grossly normal right ventricular  systolic function. Normal tricuspid valve. Minimal  tricuspid regurgitation. Normal pulmonic valve.  Pericardium/PleuraNormal pericardium with no pericardial  effusion.  ------------------------------------------------------------------------  CONCLUSIONS:  1. Normal mitral valve. Minimal mitral regurgitation.  2. Normal left ventricular internal dimensions and wall  thicknesses.  3. Endocardium not well visualized; grossly normal left  ventricular systolic function.  4. The right ventricle is not well visualized; grossly  normal right ventricular systolic function.  ------------------------------------------------------------------------  Confirmed on  2020 - 17:54:57 by Lawrence Ness M.D.  ------------------------------------------------------------------------        EXAM:  MR CARD MORPH FUNCTION WAW IC      PROCEDURE DATE:  2020     INTERPRETATION:    CLINICAL INDICATION: Abnormal EKG, cardiac arrest, normal heart catheterization- r/o sarcoid or ARVD, amyloid    COMPARISON: None    TECHNIQUE:  Using a torso phased-array coil, multiplanar short axis and two-, three- and four-chamber long axis views of the heart were obtained using cine imaging to assess wall motion. MR imaging was performed with a 3D gradient echo technique after the uneventfuladministration of 10 ml of gadolinium-based contrast agent (Gadavist). Multiplanar delayed enhancement inversion recovery images were obtained approximately 10-15 minutes following the administration of the gadolinium agent.    Postprocessing including measurement of cardiac functional parameters was performed on an independent workstation.      FINDINGS:      The left ventricle is qualitatively normal in size with normal systolic function.     There is normal right ventricular size and normal global systolic function. There is no fatty infiltration of the RV wall. No delayed enhancement is seen within the right ventricular myocardium. There are no regional wall motion abnormalities or focal aneurysmal motion.    Mild prolapse of the anteriormitral leaflet of the mitral valve. The left atrium is normal in size. There is qualitatively mitral regurgitation.    The right atrium is normal in size.  There is qualitatively no significant tricuspid regurgitation.    Qualitatively, there is no significant aortic regurgitation and no significant aortic stenosis.     The thoracic aorta is normal in diameter.    No pericardial effusion.    LEFT VENTRICULAR (LV) REGIONAL FUNCTION     LV regional function demonstrates no segmental wall motion abnormality.    LATE GADOLINIUM ENHANCEMENT (LGE)     On delayed contrast enhanced images, patchy subepicardial late gadolinium enhancement within the inferoseptum at the mid-level to apex.       QUANTITATIVE ANALYSIS     Quantitative functional parameters was not obtained secondary to technical factors.     Additional findings: None. Please note this examination is focused on the heart.       IMPRESSION:     1.  The LV is normal in size with qualitatively normal systolic function.    2.  Patchy subepicardial late gadolinium enhancement within the inferoseptum at the mid-level to apex which can represent scar or fibrosis secondary to nonischemic cardiomyopathy such as sarcoidosis. No evidence of ARVD or amyloidosis.      LAMONT VASQUEZ, ATTENDING RADIOLOGIST  This document has been electronically signed. 2020  3:10PM MARKELL GARCIAМАРИНА  [id number]    HISTORY OF PRESENT ILLNESS:        PAST MEDICAL & SURGICAL HISTORY:  Hypertension, unspecified type  Cardiac arrest: 2 week ago, defib 4x, brought to [facility] where she was found to have normal cors  Diabetes  History of tubal ligation  History of   History of sleeve gastrectomy      Review of Systems:  Gen:  No fevers/chills, weight loss  HEENT: No blurry vision, no difficulty swallowing, no oral or nasal ulcers  CVS: No chest pain/palpitations  Resp: No SOB/wheezing  GI: No N/V/C/D/abdominal pain  MSK:  Skin: No new rashes  Neuro: No headaches    MEDICATIONS  (STANDING):  dextrose 5%. 1000 milliLiter(s) (50 mL/Hr) IV Continuous <Continuous>  dextrose 50% Injectable 12.5 Gram(s) IV Push once  dextrose 50% Injectable 25 Gram(s) IV Push once  dextrose 50% Injectable 25 Gram(s) IV Push once  heparin  Injectable 5000 Unit(s) SubCutaneous every 8 hours  insulin lispro (HumaLOG) corrective regimen sliding scale   SubCutaneous three times a day before meals  lisinopril 5 milliGRAM(s) Oral daily  pantoprazole    Tablet 40 milliGRAM(s) Oral before breakfast    MEDICATIONS  (PRN):  dextrose 40% Gel 15 Gram(s) Oral once PRN Blood Glucose LESS THAN 70 milliGRAM(s)/deciliter  glucagon  Injectable 1 milliGRAM(s) IntraMuscular once PRN Glucose LESS THAN 70 milligrams/deciliter    Allergies  No Known Allergies  Intolerances    PERTINENT MEDICATION HISTORY:  SOCIAL HISTORY:  OCCUPATION:  TRAVEL HISTORY:  FAMILY HISTORY:    Vital Signs Last 24 Hrs  T(C): 36.4 (2020 11:46), Max: 36.8 (2020 21:30)  T(F): 97.5 (2020 11:46), Max: 98.2 (2020 21:30)  HR: 94 (2020 11:46) (70 - 94)  BP: 128/77 (2020 11:46) (104/68 - 130/79)  BP(mean): --  RR: 18 (2020 11:46) (15 - 18)  SpO2: 100% (2020 11:46) (99% - 100%)    Physical Exam:  General: No apparent distress  HEENT: EOMI, MMM  CVS: +S1/S2, RRR, no murmurs/rubs/gallops  Resp: CTA b/l. No crackles/wheezing  GI: Soft, NT/ND +BS  MSK:  Neuro: AAOx3  Skin: no visible rashes    LABS:                        10.5   3.23  )-----------( 334      ( 2020 06:39 )             36.3     01-    137  |  103  |  14  ----------------------------<  142<H>  4.5   |  24  |  0.75    Ca    9.6      2020 06:39  Phos  3.2     -  Mg     1.9     -      PT/INR - ( 2020 06:39 )   PT: 11.6 SEC;   INR: 1.02          PTT - ( 2020 06:39 )  PTT:29.1 SEC      RADIOLOGY & ADDITIONAL STUDIES:    Hemoglobin A1C, Whole Blood in AM (20 @ 06:55)    Hemoglobin A1C, Whole Blood: 6.5: High Risk (prediabetic)    5.7 - 6.4 %  Diabetic, diagnostic           > 6.5 %  ADA diabetic treatment goal    < 7.0 %    HbA1C values may not accurately reflect mean blood glucose  in patients with Hb variants.  Suggest clinical correlation. %        EXAM:  XR CHEST PA LAT 2V      PROCEDURE DATE:  Mp 15 2020     INTERPRETATION:  CLINICAL INFORMATION: Palpitations    TIME OF EXAMINATION: January 15, 2020 at 2:40 PM    EXAM: PA and Lateral Chest    FINDINGS:  Examination in frontal and lateral projection fails to show evidence of any active pulmonary disease.  The heart is not enlarged and there is no pleural effusion or pneumothorax.  There is no acute bone pathology.    COMPARISON: No prior exams available.      IMPRESSION: Normal chest      FRANTZ MELVIN M.D., ATTENDING RADIOLOGIST  This document has been electronically signed. Mp 15 2020  3:03PM                Patient name: MARKELL KUMAR  YOB: 1965   Age: 54 (F)   MR#: [id number]  Study Date: 2020  [facility] Jaky [facility] RDCS  Study quality: Technically Difficult  Referring Physician: Felix Grover MD  Blood Pressure: 118/80 mmHg  Height: 170 cm  Weight: 108 kg  BSA: 2.2 m2  ------------------------------------------------------------------------  PROCEDURE: Transthoracic echocardiogram with 2-D, M-Mode  and complete spectral and color flow Doppler.  INDICATION: Essential (primary) hypertension (I10)  ------------------------------------------------------------------------  DIMENSIONS:  Dimensions:     Normal Values:  LA:     3.1 cm    2.0 - 4.0 cm  Ao:     2.6 cm    2.0 - 3.8 cm  SEPTUM: 0.7 cm 0.6 - 1.2 cm  PWT:    0.7 cm    0.6 - 1.1 cm  LVIDd:  3.9 cm    3.0 - 5.6 cm  LVIDs:  2.6 cm    1.8 - 4.0 cm  Derived Variables:  LVMI: 34 g/m2  RWT: 0.35  Fractional short: 33 %  Ejection Fraction (Teicholtz): 63 %  ------------------------------------------------------------------------  OBSERVATIONS:  Mitral Valve: Normal mitral valve. Minimal mitral  regurgitation.  Aortic Root: Normal aortic root.  Aortic Valve: Normal trileaflet aortic valve.  Left Atrium: Normal left atrium.  Left Ventricle: Endocardium not well visualized; grossly  normal left ventricular systolic function. Normal left  ventricular internal dimensions and wall thicknesses.  Right Heart: Normal right atrium. The right ventricle is  not well visualized; grossly normal right ventricular  systolic function. Normal tricuspid valve. Minimal  tricuspid regurgitation. Normal pulmonic valve.  Pericardium/PleuraNormal pericardium with no pericardial  effusion.  ------------------------------------------------------------------------  CONCLUSIONS:  1. Normal mitral valve. Minimal mitral regurgitation.  2. Normal left ventricular internal dimensions and wall  thicknesses.  3. Endocardium not well visualized; grossly normal left  ventricular systolic function.  4. The right ventricle is not well visualized; grossly  normal right ventricular systolic function.  ------------------------------------------------------------------------  Confirmed on  2020 - 17:54:57 by Lawrence Ness M.D.  ------------------------------------------------------------------------        EXAM:  MR CARD MORPH FUNCTION WAW       PROCEDURE DATE:  2020     INTERPRETATION:    CLINICAL INDICATION: Abnormal EKG, cardiac arrest, normal heart catheterization- r/o sarcoid or ARVD, amyloid    COMPARISON: None    TECHNIQUE:  Using a torso phased-array coil, multiplanar short axis and two-, three- and four-chamber long axis views of the heart were obtained using cine imaging to assess wall motion. MR imaging was performed with a 3D gradient echo technique after the uneventfuladministration of 10 ml of gadolinium-based contrast agent (Gadavist). Multiplanar delayed enhancement inversion recovery images were obtained approximately 10-15 minutes following the administration of the gadolinium agent.    Postprocessing including measurement of cardiac functional parameters was performed on an independent workstation.      FINDINGS:      The left ventricle is qualitatively normal in size with normal systolic function.     There is normal right ventricular size and normal global systolic function. There is no fatty infiltration of the RV wall. No delayed enhancement is seen within the right ventricular myocardium. There are no regional wall motion abnormalities or focal aneurysmal motion.    Mild prolapse of the anteriormitral leaflet of the mitral valve. The left atrium is normal in size. There is qualitatively mitral regurgitation.    The right atrium is normal in size.  There is qualitatively no significant tricuspid regurgitation.    Qualitatively, there is no significant aortic regurgitation and no significant aortic stenosis.     The thoracic aorta is normal in diameter.    No pericardial effusion.    LEFT VENTRICULAR (LV) REGIONAL FUNCTION     LV regional function demonstrates no segmental wall motion abnormality.    LATE GADOLINIUM ENHANCEMENT (LGE)     On delayed contrast enhanced images, patchy subepicardial late gadolinium enhancement within the inferoseptum at the mid-level to apex.       QUANTITATIVE ANALYSIS     Quantitative functional parameters was not obtained secondary to technical factors.     Additional findings: None. Please note this examination is focused on the heart.       IMPRESSION:     1.  The LV is normal in size with qualitatively normal systolic function.    2.  Patchy subepicardial late gadolinium enhancement within the inferoseptum at the mid-level to apex which can represent scar or fibrosis secondary to nonischemic cardiomyopathy such as sarcoidosis. No evidence of ARVD or amyloidosis.      LAMONT VASQUEZ, ATTENDING RADIOLOGIST  This document has been electronically signed. 2020  3:10PM MARKELL GARCIAМАРИНА  [id number]    HISTORY OF PRESENT ILLNESS:    54yoF with PMHx of HTN, DM (A1c 6.5%), recent VF arrest on 2019 at OSH while visiting [place], p/w palpitations and dizziness.   Since her discharge from the [facility], she was supposed to follow up with a cardiologist but developed palpitations and dizziness before appointment date and came to ED.   During current hospitalization, pt reported "film" over L eye for a few seconds. Denies eye pain, redness, diplopia, photophobia, visual loss, HA.  Denies f/c, joint pain or swelling, sicca sx, Raynauds, hematuria, frothy urine, abdominal pain, focal weakness or numbness, unintentional weight loss, recent uri sx, sick contacts.    PAST MEDICAL & SURGICAL HISTORY:  Hypertension, unspecified type  Cardiac arrest: 2 week ago, defib 4x, brought to [facility] where she was found to have normal cors  Diabetes  History of tubal ligation  History of   History of sleeve gastrectomy      Review of Systems: as per HPI    MEDICATIONS  (STANDING):  dextrose 5%. 1000 milliLiter(s) (50 mL/Hr) IV Continuous <Continuous>  dextrose 50% Injectable 12.5 Gram(s) IV Push once  dextrose 50% Injectable 25 Gram(s) IV Push once  dextrose 50% Injectable 25 Gram(s) IV Push once  heparin  Injectable 5000 Unit(s) SubCutaneous every 8 hours  insulin lispro (HumaLOG) corrective regimen sliding scale   SubCutaneous three times a day before meals  lisinopril 5 milliGRAM(s) Oral daily  pantoprazole    Tablet 40 milliGRAM(s) Oral before breakfast    MEDICATIONS  (PRN):  dextrose 40% Gel 15 Gram(s) Oral once PRN Blood Glucose LESS THAN 70 milliGRAM(s)/deciliter  glucagon  Injectable 1 milliGRAM(s) IntraMuscular once PRN Glucose LESS THAN 70 milligrams/deciliter    Allergies  No Known Allergies  Intolerances    PERTINENT MEDICATION HISTORY:  SOCIAL HISTORY: denies toxic habits  TRAVEL HISTORY: [place]  FAMILY HISTORY: no h/o autoimmune disease or sarcoidosis    Vital Signs Last 24 Hrs  T(C): 36.4 (2020 11:46), Max: 36.8 (2020 21:30)  T(F): 97.5 (2020 11:46), Max: 98.2 (2020 21:30)  HR: 94 (2020 11:46) (70 - 94)  BP: 128/77 (2020 11:46) (104/68 - 130/79)  BP(mean): --  RR: 18 (2020 11:46) (15 - 18)  SpO2: 100% (2020 11:46) (99% - 100%)    Physical Exam:  General: No apparent distress  HEENT: EOMI, MMM, no oral/nasal ulcers  CVS: RRR, no murmurs/rubs/gallops  Resp: CTA b/l. No crackles/wheezing  GI: Soft, NT/ND +BS  MSK: No synovitis  Neuro: AAOx3  Skin: no visible rashes, no EN    LABS:                        10.5   3.23  )-----------( 334      ( 2020 06:39 )             36.3     01-22    137  |  103  |  14  ----------------------------<  142<H>  4.5   |  24  |  0.75    Ca    9.6      2020 06:39  Phos  3.2     01-22  Mg     1.9     01-22      PT/INR - ( 2020 06:39 )   PT: 11.6 SEC;   INR: 1.02          PTT - ( 2020 06:39 )  PTT:29.1 SEC      RADIOLOGY & ADDITIONAL STUDIES:    Hemoglobin A1C, Whole Blood in AM (20 @ 06:55)    Hemoglobin A1C, Whole Blood: 6.5: High Risk (prediabetic)    5.7 - 6.4 %  Diabetic, diagnostic           > 6.5 %  ADA diabetic treatment goal    < 7.0 %    HbA1C values may not accurately reflect mean blood glucose  in patients with Hb variants.  Suggest clinical correlation. %        EXAM:  XR CHEST PA LAT 2V      PROCEDURE DATE:  Mp 15 2020     INTERPRETATION:  CLINICAL INFORMATION: Palpitations    TIME OF EXAMINATION: January 15, 2020 at 2:40 PM    EXAM: PA and Lateral Chest    FINDINGS:  Examination in frontal and lateral projection fails to show evidence of any active pulmonary disease.  The heart is not enlarged and there is no pleural effusion or pneumothorax.  There is no acute bone pathology.    COMPARISON: No prior exams available.      IMPRESSION: Normal chest      EDWARD WIND M.D., ATTENDING RADIOLOGIST  This document has been electronically signed. Mp 15 2020  3:03PM                Patient name: MARKELL KUMAR  YOB: 1965   Age: 54 (F)   MR#: [id number]  Study Date: 2020  [facility]  Study quality: Technically Difficult  Referring Physician: Felix Grover MD  Blood Pressure: 118/80 mmHg  Height: 170 cm  Weight: 108 kg  BSA: 2.2 m2  ------------------------------------------------------------------------  PROCEDURE: Transthoracic echocardiogram with 2-D, M-Mode  and complete spectral and color flow Doppler.  INDICATION: Essential (primary) hypertension (I10)  ------------------------------------------------------------------------  DIMENSIONS:  Dimensions:     Normal Values:  LA:     3.1 cm    2.0 - 4.0 cm  Ao:     2.6 cm    2.0 - 3.8 cm  SEPTUM: 0.7 cm 0.6 - 1.2 cm  PWT:    0.7 cm    0.6 - 1.1 cm  LVIDd:  3.9 cm    3.0 - 5.6 cm  LVIDs:  2.6 cm    1.8 - 4.0 cm  Derived Variables:  LVMI: 34 g/m2  RWT: 0.35  Fractional short: 33 %  Ejection Fraction (Teicholtz): 63 %  ------------------------------------------------------------------------  OBSERVATIONS:  Mitral Valve: Normal mitral valve. Minimal mitral  regurgitation.  Aortic Root: Normal aortic root.  Aortic Valve: Normal trileaflet aortic valve.  Left Atrium: Normal left atrium.  Left Ventricle: Endocardium not well visualized; grossly  normal left ventricular systolic function. Normal left  ventricular internal dimensions and wall thicknesses.  Right Heart: Normal right atrium. The right ventricle is  not well visualized; grossly normal right ventricular  systolic function. Normal tricuspid valve. Minimal  tricuspid regurgitation. Normal pulmonic valve.  Pericardium/PleuraNormal pericardium with no pericardial  effusion.  ------------------------------------------------------------------------  CONCLUSIONS:  1. Normal mitral valve. Minimal mitral regurgitation.  2. Normal left ventricular internal dimensions and wall  thicknesses.  3. Endocardium not well visualized; grossly normal left  ventricular systolic function.  4. The right ventricle is not well visualized; grossly  normal right ventricular systolic function.  ------------------------------------------------------------------------  Confirmed on  2020 - 17:54:57 by Lawrence Ness M.D.  ------------------------------------------------------------------------        EXAM:  MR CARD MORPH FUNCTION WAW       PROCEDURE DATE:  2020     INTERPRETATION:    CLINICAL INDICATION: Abnormal EKG, cardiac arrest, normal heart catheterization- r/o sarcoid or ARVD, amyloid    COMPARISON: None    TECHNIQUE:  Using a torso phased-array coil, multiplanar short axis and two-, three- and four-chamber long axis views of the heart were obtained using cine imaging to assess wall motion. MR imaging was performed with a 3D gradient echo technique after the uneventfuladministration of 10 ml of gadolinium-based contrast agent (Gadavist). Multiplanar delayed enhancement inversion recovery images were obtained approximately 10-15 minutes following the administration of the gadolinium agent.    Postprocessing including measurement of cardiac functional parameters was performed on an independent workstation.      FINDINGS:      The left ventricle is qualitatively normal in size with normal systolic function.     There is normal right ventricular size and normal global systolic function. There is no fatty infiltration of the RV wall. No delayed enhancement is seen within the right ventricular myocardium. There are no regional wall motion abnormalities or focal aneurysmal motion.    Mild prolapse of the anteriormitral leaflet of the mitral valve. The left atrium is normal in size. There is qualitatively mitral regurgitation.    The right atrium is normal in size.  There is qualitatively no significant tricuspid regurgitation.    Qualitatively, there is no significant aortic regurgitation and no significant aortic stenosis.     The thoracic aorta is normal in diameter.    No pericardial effusion.    LEFT VENTRICULAR (LV) REGIONAL FUNCTION     LV regional function demonstrates no segmental wall motion abnormality.    LATE GADOLINIUM ENHANCEMENT (LGE)     On delayed contrast enhanced images, patchy subepicardial late gadolinium enhancement within the inferoseptum at the mid-level to apex.       QUANTITATIVE ANALYSIS     Quantitative functional parameters was not obtained secondary to technical factors.     Additional findings: None. Please note this examination is focused on the heart.       IMPRESSION:     1.  The LV is normal in size with qualitatively normal systolic function.    2.  Patchy subepicardial late gadolinium enhancement within the inferoseptum at the mid-level to apex which can represent scar or fibrosis secondary to nonischemic cardiomyopathy such as sarcoidosis. No evidence of ARVD or amyloidosis.      LAMONT VASQUEZ, ATTENDING RADIOLOGIST  This document has been electronically signed. 2020  3:10PM              EXAM:  CT ABDOMEN AND PELVIS      EXAM:  CT CHEST      PROCEDURE DATE:  2020       INTERPRETATION:  CLINICAL INFORMATION: Syncope. Evaluate for sarcoidosis.    COMPARISON: None.    PROCEDURE:   CT of the Chest, Abdomen and Pelvis was performed without intravenous contrast.   Intravenous contrast: None.  Oral contrast: None.  Sagittal and coronal reformats were performed.    FINDINGS:    CHEST:     LUNGS AND LARGE AIRWAYS: Patent central airways. Bibasilar subsegmental atelectasis.  PLEURA: No pleural effusion.  VESSELS: Within normal limits.  HEART: Heart size is normal. No pericardial effusion.  MEDIASTINUM AND YOUNG: No lymphadenopathy.  CHEST WALL AND LOWER NECK: Within normal limits.    ABDOMEN AND PELVIS:    LIVER: Within normal limits.  BILE DUCTS: Normal caliber.  GALLBLADDER: Within normal limits.  SPLEEN: Within normal limits.  PANCREAS: Within normal limits.  ADRENALS: Within normal limits.  KIDNEYS/URETERS: Punctate left lower pole calculus. No hydronephrosis.    BLADDER: Within normal limits.  REPRODUCTIVE ORGANS: The endometrium measures up to 9 mm. No solid adnexal mass.    BOWEL: Small hiatal hernia. Sleeve gastrectomy. No bowel obstruction. Appendix within normal limits. Colonic diverticulosis.  PERITONEUM:No ascites.  VESSELS: Within normal limits.  RETROPERITONEUM/LYMPH NODES: No lymphadenopathy.    ABDOMINAL WALL: Umbilical hernia containing fat and bowel.  BONES: Chronic bilateral rib fractures. Degenerative changes of the spine.    IMPRESSION:     No evidence of suggest pulmonary manifestations of sarcoidosis.     No evidence of thoracic, abdominal, or pelvic lymphadenopathy.        PACO CHANDLER M.D., RADIOLOGY RESIDENT  This document has been electronically signed.  KATEY WOOTEN M.D., ATTENDING RADIOLOGIST  This document has been electronically signed. 2020  4:07PM MARKELL GARCIAМАРИНА  [id number]    HISTORY OF PRESENT ILLNESS:    54yoF with PMHx of HTN, DM (A1c 6.5%), recent VF arrest on 2019 at OSH while visiting [place], p/w palpitations and dizziness.   Since her discharge from the [facility] post [facility], she was supposed to follow up with a cardiologist but developed palpitations and dizziness before appointment date and came to ED.   During current hospitalization, pt reported "film" over L eye for a few seconds. Denies eye pain, redness, diplopia, photophobia, visual loss, HA.  Denies f/c, joint pain or swelling, sicca sx, Raynauds, hematuria, frothy urine, abdominal pain, focal weakness or numbness, unintentional weight loss, recent uri sx, sick contacts.    PAST MEDICAL & SURGICAL HISTORY:  Hypertension, unspecified type  Cardiac arrest: 2 week ago, defib 4x, brought to [facility] where she was found to have normal cors  Diabetes  History of tubal ligation  History of   History of sleeve gastrectomy    OBSTETRIC history: 1 x child, pregnancy complicated by pre-eclampsia/gestational hypertension, delivery via , no miscarriages    Review of Systems: as per HPI    MEDICATIONS  (STANDING):  dextrose 5%. 1000 milliLiter(s) (50 mL/Hr) IV Continuous <Continuous>  dextrose 50% Injectable 12.5 Gram(s) IV Push once  dextrose 50% Injectable 25 Gram(s) IV Push once  dextrose 50% Injectable 25 Gram(s) IV Push once  heparin  Injectable 5000 Unit(s) SubCutaneous every 8 hours  insulin lispro (HumaLOG) corrective regimen sliding scale   SubCutaneous three times a day before meals  lisinopril 5 milliGRAM(s) Oral daily  pantoprazole    Tablet 40 milliGRAM(s) Oral before breakfast    MEDICATIONS  (PRN):  dextrose 40% Gel 15 Gram(s) Oral once PRN Blood Glucose LESS THAN 70 milliGRAM(s)/deciliter  glucagon  Injectable 1 milliGRAM(s) IntraMuscular once PRN Glucose LESS THAN 70 milligrams/deciliter    Allergies  No Known Allergies  Intolerances    PERTINENT MEDICATION HISTORY:  SOCIAL HISTORY: denies toxic habits  TRAVEL HISTORY: [place]  FAMILY HISTORY: no h/o autoimmune disease or sarcoidosis    Vital Signs Last 24 Hrs  T(C): 36.4 (2020 11:46), Max: 36.8 (2020 21:30)  T(F): 97.5 (2020 11:46), Max: 98.2 (2020 21:30)  HR: 94 (2020 11:46) (70 - 94)  BP: 128/77 (2020 11:46) (104/68 - 130/79)  BP(mean): --  RR: 18 (2020 11:46) (15 - 18)  SpO2: 100% (2020 11:46) (99% - 100%)    Physical Exam:  General: No apparent distress  HEENT: EOMI, MMM, no oral/nasal ulcers  CVS: RRR, no murmurs/rubs/gallops  Resp: CTA b/l. No crackles/wheezing  GI: Soft, NT/ND +BS  MSK: No synovitis  Neuro: AAOx3  Skin: no visible rashes, no EN    LABS:                        10.5   3.23  )-----------( 334      ( 2020 06:39 )             36.3     01-22    137  |  103  |  14  ----------------------------<  142<H>  4.5   |  24  |  0.75    Ca    9.6      2020 06:39  Phos  3.2     01-22  Mg     1.9     01-22      PT/INR - ( 2020 06:39 )   PT: 11.6 SEC;   INR: 1.02          PTT - ( 2020 06:39 )  PTT:29.1 SEC      RADIOLOGY & ADDITIONAL STUDIES:    Hemoglobin A1C, Whole Blood in AM (20 @ 06:55)    Hemoglobin A1C, Whole Blood: 6.5: High Risk (prediabetic)    5.7 - 6.4 %  Diabetic, diagnostic           > 6.5 %  ADA diabetic treatment goal    < 7.0 %    HbA1C values may not accurately reflect mean blood glucose  in patients with Hb variants.  Suggest clinical correlation. %        EXAM:  XR CHEST PA LAT 2V      PROCEDURE DATE:  Mp 15 2020     INTERPRETATION:  CLINICAL INFORMATION: Palpitations    TIME OF EXAMINATION: January 15, 2020 at 2:40 PM    EXAM: PA and Lateral Chest    FINDINGS:  Examination in frontal and lateral projection fails to show evidence of any active pulmonary disease.  The heart is not enlarged and there is no pleural effusion or pneumothorax.  There is no acute bone pathology.    COMPARISON: No prior exams available.      IMPRESSION: Normal chest      FRANTZ MELVIN M.D., ATTENDING RADIOLOGIST  This document has been electronically signed. Mp 15 2020  3:03PM                Patient name: MARKELL KUMAR  YOB: 1965   Age: 54 (F)   MR#: [id number]  Study Date: 2020  [facility]  Study quality: Technically Difficult  Referring Physician: Felix Grover MD  Blood Pressure: 118/80 mmHg  Height: 170 cm  Weight: 108 kg  BSA: 2.2 m2  ------------------------------------------------------------------------  PROCEDURE: Transthoracic echocardiogram with 2-D, M-Mode  and complete spectral and color flow Doppler.  INDICATION: Essential (primary) hypertension (I10)  ------------------------------------------------------------------------  DIMENSIONS:  Dimensions:     Normal Values:  LA:     3.1 cm    2.0 - 4.0 cm  Ao:     2.6 cm    2.0 - 3.8 cm  SEPTUM: 0.7 cm 0.6 - 1.2 cm  PWT:    0.7 cm    0.6 - 1.1 cm  LVIDd:  3.9 cm    3.0 - 5.6 cm  LVIDs:  2.6 cm    1.8 - 4.0 cm  Derived Variables:  LVMI: 34 g/m2  RWT: 0.35  Fractional short: 33 %  Ejection Fraction (Carlosicholtz): 63 %  ------------------------------------------------------------------------  OBSERVATIONS:  Mitral Valve: Normal mitral valve. Minimal mitral  regurgitation.  Aortic Root: Normal aortic root.  Aortic Valve: Normal trileaflet aortic valve.  Left Atrium: Normal left atrium.  Left Ventricle: Endocardium not well visualized; grossly  normal left ventricular systolic function. Normal left  ventricular internal dimensions and wall thicknesses.  Right Heart: Normal right atrium. The right ventricle is  not well visualized; grossly normal right ventricular  systolic function. Normal tricuspid valve. Minimal  tricuspid regurgitation. Normal pulmonic valve.  Pericardium/PleuraNormal pericardium with no pericardial  effusion.  ------------------------------------------------------------------------  CONCLUSIONS:  1. Normal mitral valve. Minimal mitral regurgitation.  2. Normal left ventricular internal dimensions and wall  thicknesses.  3. Endocardium not well visualized; grossly normal left  ventricular systolic function.  4. The right ventricle is not well visualized; grossly  normal right ventricular systolic function.  ------------------------------------------------------------------------  Confirmed on  2020 - 17:54:57 by Lawrence Ness M.D.  ------------------------------------------------------------------------        EXAM:  MR CARD MORPH FUNCTION WAW       PROCEDURE DATE:  2020     INTERPRETATION:    CLINICAL INDICATION: Abnormal EKG, cardiac arrest, normal heart catheterization- r/o sarcoid or ARVD, amyloid    COMPARISON: None    TECHNIQUE:  Using a torso phased-array coil, multiplanar short axis and two-, three- and four-chamber long axis views of the heart were obtained using cine imaging to assess wall motion. MR imaging was performed with a 3D gradient echo technique after the uneventfuladministration of 10 ml of gadolinium-based contrast agent (Gadavist). Multiplanar delayed enhancement inversion recovery images were obtained approximately 10-15 minutes following the administration of the gadolinium agent.    Postprocessing including measurement of cardiac functional parameters was performed on an independent workstation.      FINDINGS:      The left ventricle is qualitatively normal in size with normal systolic function.     There is normal right ventricular size and normal global systolic function. There is no fatty infiltration of the RV wall. No delayed enhancement is seen within the right ventricular myocardium. There are no regional wall motion abnormalities or focal aneurysmal motion.    Mild prolapse of the anteriormitral leaflet of the mitral valve. The left atrium is normal in size. There is qualitatively mitral regurgitation.    The right atrium is normal in size.  There is qualitatively no significant tricuspid regurgitation.    Qualitatively, there is no significant aortic regurgitation and no significant aortic stenosis.     The thoracic aorta is normal in diameter.    No pericardial effusion.    LEFT VENTRICULAR (LV) REGIONAL FUNCTION     LV regional function demonstrates no segmental wall motion abnormality.    LATE GADOLINIUM ENHANCEMENT (LGE)     On delayed contrast enhanced images, patchy subepicardial late gadolinium enhancement within the inferoseptum at the mid-level to apex.       QUANTITATIVE ANALYSIS     Quantitative functional parameters was not obtained secondary to technical factors.     Additional findings: None. Please note this examination is focused on the heart.       IMPRESSION:     1.  The LV is normal in size with qualitatively normal systolic function.    2.  Patchy subepicardial late gadolinium enhancement within the inferoseptum at the mid-level to apex which can represent scar or fibrosis secondary to nonischemic cardiomyopathy such as sarcoidosis. No evidence of ARVD or amyloidosis.      LAMONT VASQUEZ, ATTENDING RADIOLOGIST  This document has been electronically signed. 2020  3:10PM              EXAM:  CT ABDOMEN AND PELVIS      EXAM:  CT CHEST      PROCEDURE DATE:  2020       INTERPRETATION:  CLINICAL INFORMATION: Syncope. Evaluate for sarcoidosis.    COMPARISON: None.    PROCEDURE:   CT of the Chest, Abdomen and Pelvis was performed without intravenous contrast.   Intravenous contrast: None.  Oral contrast: None.  Sagittal and coronal reformats were performed.    FINDINGS:    CHEST:     LUNGS AND LARGE AIRWAYS: Patent central airways. Bibasilar subsegmental atelectasis.  PLEURA: No pleural effusion.  VESSELS: Within normal limits.  HEART: Heart size is normal. No pericardial effusion.  MEDIASTINUM AND YOUNG: No lymphadenopathy.  CHEST WALL AND LOWER NECK: Within normal limits.    ABDOMEN AND PELVIS:    LIVER: Within normal limits.  BILE DUCTS: Normal caliber.  GALLBLADDER: Within normal limits.  SPLEEN: Within normal limits.  PANCREAS: Within normal limits.  ADRENALS: Within normal limits.  KIDNEYS/URETERS: Punctate left lower pole calculus. No hydronephrosis.    BLADDER: Within normal limits.  REPRODUCTIVE ORGANS: The endometrium measures up to 9 mm. No solid adnexal mass.    BOWEL: Small hiatal hernia. Sleeve gastrectomy. No bowel obstruction. Appendix within normal limits. Colonic diverticulosis.  PERITONEUM:No ascites.  VESSELS: Within normal limits.  RETROPERITONEUM/LYMPH NODES: No lymphadenopathy.    ABDOMINAL WALL: Umbilical hernia containing fat and bowel.  BONES: Chronic bilateral rib fractures. Degenerative changes of the spine.    IMPRESSION:     No evidence of suggest pulmonary manifestations of sarcoidosis.     No evidence of thoracic, abdominal, or pelvic lymphadenopathy.        PACO CHANDLER M.D., RADIOLOGY RESIDENT  This document has been electronically signed.  KATEY WOOTEN M.D., ATTENDING RADIOLOGIST  This document has been electronically signed. 2020  4:07PM

## 2020-01-22 NOTE — PROGRESS NOTE ADULT - ATTENDING COMMENTS
Agree with above assessment and plan as outlined above.    - MRI noted  - EP f/u for potential ICD    Felix Grover MD, [facility]  BEEPER [phone] Patient seen and examined, agree with above assessment and plan as transcribed above.      - MRI noted  - EP f/u for ICD Friday    Felix Grover MD, [facility]  BEEPER [phone]
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
## 2020-01-22 NOTE — CONSULT NOTE ADULT - ASSESSMENT
54yoF with PMHx of HTN, DM, recent VF arrest, p/w palpitations and dizziness.   TTE unrevealing, EKG with findings to suggest possible infiltrative CM.  Found to have NICM with cardiac MRI suggestive of cardiac sarcoidosis given focal late gadolinium enhancement.   Cardiac sarcoidosis can present as conduction and ventricular abnormalities.   Definitive diagnosis includes: histological evidence of extra cardiac sarcoid (if not present, then endomyocardial bx), in addition to positive cardiac MRI (as its sensitivity is >90%), and other causes of cardiac manifestations have been ruled out.    Recommendations: FINAL PENDING  -Please send UA with microscopy, ACE level, vitamin D 1,25 and 25-OH, PTH, quantiferon, acute hepatitis panel  -Would obtain CT chest to check for lung involvement - LAD / granulomas  -EP reccs for ICD placement for prevention of SCD      Marti Ware MD  Rheumatology Fellow  pager [phone] 54yoF with PMHx of HTN, DM, recent VF arrest, p/w palpitations and dizziness.   TTE unrevealing, EKG with findings to suggest possible infiltrative CM.  Found to have NICM with cardiac MRI suggestive of cardiac sarcoidosis given focal late gadolinium enhancement.   Cardiac sarcoidosis can present as conduction and ventricular abnormalities.   Conduction abnormalities usually present as AV (complete/incomplete) block.   Definitive diagnosis includes: histological evidence of extra cardiac sarcoid (if not present, then endomyocardial bx), in addition to positive cardiac MRI (as its sensitivity is >90%, however not specific, would need FDG-PET to assess for active disease vs fibrosis), and other causes of cardiac manifestations have been ruled out.  Concern for sarcoid is currently probable at this time. Would need to rule out myocarditis and other etiologies.    Recommendations:  -Please send UA with microscopy, ACE level, vitamin D 1,25 and 25-OH, PTH, quantiferon, acute hepatitis panel  -Would obtain CT chest to check for lung involvement - LAD / granulomas  -Gold standard would be endomyocardial bx, however invasive procedure. Would obtain FDG-PET scan to assess of active sarcoidosis vs fibrosis  -Ophthalmology eval inpatient to r/o ocular disease  -EP reccs for ICD placement for prevention of SCD  -PFTs can be done as outpatient      Marti Ware MD  Rheumatology Fellow  pager [phone] 54yoF with PMHx of HTN, DM (A1c 6.5%), recent VF arrest, p/w palpitations and dizziness.   TTE unrevealing, EKG with findings to suggest possible infiltrative CM.  Found to have NICM with cardiac MRI suggestive of cardiac sarcoidosis given focal late gadolinium enhancement.   Cardiac sarcoidosis most commonly presents as conduction abnormalities, usually AV block.   Definitive diagnosis includes: histological evidence of extra cardiac sarcoid (if not present, then endomyocardial bx), in addition to positive cardiac MRI (as its sensitivity is >90%, however not specific), and other causes of cardiac manifestations have been ruled out.  No lung or skin evidence of sarcoid at this time. Ocular manifestation requires further eval.  Would also need to rule out other possible etiologies for late gadolinium enhancement on cardiac MRI. Will send for further autoimmune work up.      Recommendations:  -Please send dsDNA, C3, C4, UA with microscopy, UPCR, ANCA, MPO, PR3, ACE level, vitamin D 1,25 and 25-OH, PTH, quantiferon, acute hepatitis panel  -EP reccs for ICD placement for prevention of SCD  -Ophthalmology evaluation inpatient to r/o ocular disease    Marti Ware MD  Rheumatology Fellow  pager [phone]    DW Dr. Dowling, Attending 54yoF with PMHx of HTN, DM (A1c 6.5%), recent VF arrest, p/w palpitations and dizziness.   TTE unrevealing, EKG with findings to suggest possible infiltrative CM.  Found to have infiltrative process/scarring on cardiac MRI consistent wit NICM. Rheumatology called for evaluation of cardiac sarcoidosis given focal late gadolinium enhancement.   Cardiac sarcoidosis most commonly presents as conduction abnormalities, usually AV block, however can cause SVT and VT.  Definitive diagnosis includes: histological evidence of extra cardiac sarcoid (if not present, then endomyocardial bx), in addition to positive cardiac MRI (as its sensitivity is >90%, however not specific), and other causes of cardiac manifestations have been ruled out.  No lung or skin evidence of sarcoid at this time. Ocular manifestation requires further eval.  Would also need to rule out other possible etiologies for late gadolinium enhancement on cardiac MRI. Will send for further autoimmune work up.      Recommendations:  -Please send dsDNA, C3, C4, UA with microscopy, UPCR, ANCA, MPO, PR3, ACE level, vitamin D 1,25 and 25-OH, PTH, quantiferon, acute hepatitis panel  -EP reccs for ICD placement for prevention of SCD  -Ophthalmology evaluation inpatient to r/o ocular disease    Marti Ware MD  Rheumatology Fellow  pager [phone]    DW Dr. Dowling, Attending

## 2020-01-23 LAB
24R-OH-CALCIDIOL SERPL-MCNC: 13.4 NG/ML — LOW (ref 30–80)
ANION GAP SERPL CALC-SCNC: 12 MMO/L — SIGNIFICANT CHANGE UP (ref 7–14)
APPEARANCE UR: CLEAR — SIGNIFICANT CHANGE UP
BASOPHILS # BLD AUTO: 0.04 K/UL — SIGNIFICANT CHANGE UP (ref 0–0.2)
BASOPHILS NFR BLD AUTO: 1.3 % — SIGNIFICANT CHANGE UP (ref 0–2)
BILIRUB UR-MCNC: NEGATIVE — SIGNIFICANT CHANGE UP
BLOOD UR QL VISUAL: NEGATIVE — SIGNIFICANT CHANGE UP
BUN SERPL-MCNC: 13 MG/DL — SIGNIFICANT CHANGE UP (ref 7–23)
C3 SERPL-MCNC: 126.7 MG/DL — SIGNIFICANT CHANGE UP (ref 90–180)
C4 SERPL-MCNC: 38.1 MG/DL — SIGNIFICANT CHANGE UP (ref 10–40)
CALCIUM SERPL-MCNC: 9.6 MG/DL — SIGNIFICANT CHANGE UP (ref 8.4–10.5)
CHLORIDE SERPL-SCNC: 103 MMOL/L — SIGNIFICANT CHANGE UP (ref 98–107)
CO2 SERPL-SCNC: 23 MMOL/L — SIGNIFICANT CHANGE UP (ref 22–31)
COLOR SPEC: SIGNIFICANT CHANGE UP
CREAT ?TM UR-MCNC: 126.5 MG/DL — SIGNIFICANT CHANGE UP
CREAT SERPL-MCNC: 0.75 MG/DL — SIGNIFICANT CHANGE UP (ref 0.5–1.3)
EOSINOPHIL # BLD AUTO: 0.05 K/UL — SIGNIFICANT CHANGE UP (ref 0–0.5)
EOSINOPHIL NFR BLD AUTO: 1.6 % — SIGNIFICANT CHANGE UP (ref 0–6)
GLUCOSE BLDC GLUCOMTR-MCNC: 110 MG/DL — HIGH (ref 70–99)
GLUCOSE BLDC GLUCOMTR-MCNC: 138 MG/DL — HIGH (ref 70–99)
GLUCOSE BLDC GLUCOMTR-MCNC: 141 MG/DL — HIGH (ref 70–99)
GLUCOSE BLDC GLUCOMTR-MCNC: 173 MG/DL — HIGH (ref 70–99)
GLUCOSE SERPL-MCNC: 134 MG/DL — HIGH (ref 70–99)
GLUCOSE UR-MCNC: NEGATIVE — SIGNIFICANT CHANGE UP
HAV IGM SER-ACNC: NONREACTIVE — SIGNIFICANT CHANGE UP
HBV CORE IGM SER-ACNC: NONREACTIVE — SIGNIFICANT CHANGE UP
HBV SURFACE AG SER-ACNC: NONREACTIVE — SIGNIFICANT CHANGE UP
HCT VFR BLD CALC: 33.6 % — LOW (ref 34.5–45)
HCV AB S/CO SERPL IA: 0.2 S/CO — SIGNIFICANT CHANGE UP (ref 0–0.99)
HCV AB SERPL-IMP: SIGNIFICANT CHANGE UP
HGB BLD-MCNC: 9.9 G/DL — LOW (ref 11.5–15.5)
IMM GRANULOCYTES NFR BLD AUTO: 0 % — SIGNIFICANT CHANGE UP (ref 0–1.5)
KETONES UR-MCNC: NEGATIVE — SIGNIFICANT CHANGE UP
LEUKOCYTE ESTERASE UR-ACNC: NEGATIVE — SIGNIFICANT CHANGE UP
LYMPHOCYTES # BLD AUTO: 1.27 K/UL — SIGNIFICANT CHANGE UP (ref 1–3.3)
LYMPHOCYTES # BLD AUTO: 40.7 % — SIGNIFICANT CHANGE UP (ref 13–44)
MAGNESIUM SERPL-MCNC: 2 MG/DL — SIGNIFICANT CHANGE UP (ref 1.6–2.6)
MCHC RBC-ENTMCNC: 21.5 PG — LOW (ref 27–34)
MCHC RBC-ENTMCNC: 29.5 % — LOW (ref 32–36)
MCV RBC AUTO: 73 FL — LOW (ref 80–100)
MONOCYTES # BLD AUTO: 0.29 K/UL — SIGNIFICANT CHANGE UP (ref 0–0.9)
MONOCYTES NFR BLD AUTO: 9.3 % — SIGNIFICANT CHANGE UP (ref 2–14)
NEUTROPHILS # BLD AUTO: 1.47 K/UL — LOW (ref 1.8–7.4)
NEUTROPHILS NFR BLD AUTO: 47.1 % — SIGNIFICANT CHANGE UP (ref 43–77)
NITRITE UR-MCNC: NEGATIVE — SIGNIFICANT CHANGE UP
NRBC # FLD: 0 K/UL — SIGNIFICANT CHANGE UP (ref 0–0)
PH UR: 6.5 — SIGNIFICANT CHANGE UP (ref 5–8)
PLATELET # BLD AUTO: 290 K/UL — SIGNIFICANT CHANGE UP (ref 150–400)
PMV BLD: 10.3 FL — SIGNIFICANT CHANGE UP (ref 7–13)
POTASSIUM SERPL-MCNC: 4.4 MMOL/L — SIGNIFICANT CHANGE UP (ref 3.5–5.3)
POTASSIUM SERPL-SCNC: 4.4 MMOL/L — SIGNIFICANT CHANGE UP (ref 3.5–5.3)
POTASSIUM UR-SCNC: 63.5 MMOL/L — SIGNIFICANT CHANGE UP
PROT UR-MCNC: NEGATIVE — SIGNIFICANT CHANGE UP
PTH-INTACT SERPL-MCNC: 45.84 PG/ML — SIGNIFICANT CHANGE UP (ref 15–65)
RBC # BLD: 4.6 M/UL — SIGNIFICANT CHANGE UP (ref 3.8–5.2)
RBC # FLD: 17.2 % — HIGH (ref 10.3–14.5)
SODIUM SERPL-SCNC: 138 MMOL/L — SIGNIFICANT CHANGE UP (ref 135–145)
SP GR SPEC: 1.02 — SIGNIFICANT CHANGE UP (ref 1–1.04)
UROBILINOGEN FLD QL: NORMAL — SIGNIFICANT CHANGE UP
VIT D25+D1,25 OH+D1,25 PNL SERPL-MCNC: 40.6 PG/ML — SIGNIFICANT CHANGE UP (ref 19.9–79.3)
VIT D25+D1,25 OH+D1,25 PNL SERPL-MCNC: 41.3 PG/ML — SIGNIFICANT CHANGE UP (ref 19.9–79.3)
WBC # BLD: 3.12 K/UL — LOW (ref 3.8–10.5)
WBC # FLD AUTO: 3.12 K/UL — LOW (ref 3.8–10.5)

## 2020-01-23 PROCEDURE — 99232 SBSQ HOSP IP/OBS MODERATE 35: CPT

## 2020-01-23 PROCEDURE — 99222 1ST HOSP IP/OBS MODERATE 55: CPT

## 2020-01-23 RX ADMIN — HEPARIN SODIUM 5000 UNIT(S): 5000 INJECTION INTRAVENOUS; SUBCUTANEOUS at 22:08

## 2020-01-23 RX ADMIN — HEPARIN SODIUM 5000 UNIT(S): 5000 INJECTION INTRAVENOUS; SUBCUTANEOUS at 04:49

## 2020-01-23 RX ADMIN — LISINOPRIL 5 MILLIGRAM(S): 2.5 TABLET ORAL at 04:49

## 2020-01-23 RX ADMIN — Medication 1 DROP(S): at 17:11

## 2020-01-23 RX ADMIN — PANTOPRAZOLE SODIUM 40 MILLIGRAM(S): 20 TABLET, DELAYED RELEASE ORAL at 04:49

## 2020-01-23 RX ADMIN — HEPARIN SODIUM 5000 UNIT(S): 5000 INJECTION INTRAVENOUS; SUBCUTANEOUS at 15:59

## 2020-01-23 NOTE — PROGRESS NOTE ADULT - SUBJECTIVE AND OBJECTIVE BOX
Patient denies chest pain or shortness of breath.   Review of systems otherwise (-)      MEDICATIONS  (STANDING):  dextrose 5%. 1000 milliLiter(s) (50 mL/Hr) IV Continuous <Continuous>  dextrose 50% Injectable 12.5 Gram(s) IV Push once  dextrose 50% Injectable 25 Gram(s) IV Push once  dextrose 50% Injectable 25 Gram(s) IV Push once  heparin  Injectable 5000 Unit(s) SubCutaneous every 8 hours  insulin lispro (HumaLOG) corrective regimen sliding scale   SubCutaneous three times a day before meals  lisinopril 5 milliGRAM(s) Oral daily  pantoprazole    Tablet 40 milliGRAM(s) Oral before breakfast    MEDICATIONS  (PRN):  dextrose 40% Gel 15 Gram(s) Oral once PRN Blood Glucose LESS THAN 70 milliGRAM(s)/deciliter  glucagon  Injectable 1 milliGRAM(s) IntraMuscular once PRN Glucose LESS THAN 70 milligrams/deciliter      LABS:                     9.9    3.12  )-----------( 290      ( 23 Jan 2020 06:29 )             33.6   138  |  103  |  13  ----------------------------<  134<H>  4.4   |  23  |  0.75    Ca    9.6      23 Jan 2020 06:29  Phos  3.2     01-22  Mg     2.0     01-23    Creatinine Trend: 0.75<--, 0.75<--, 0.72<--, 0.70<--, 0.70<--, 0.69<--     PHYSICAL EXAM  Vital Signs Last 24 Hrs  T(C): 36.9 (23 Jan 2020 12:42), Max: 36.9 (23 Jan 2020 12:42)  T(F): 98.5 (23 Jan 2020 12:42), Max: 98.5 (23 Jan 2020 12:42)  HR: 73 (23 Jan 2020 12:42) (73 - 98)  BP: 119/61 (23 Jan 2020 12:42) (116/74 - 123/68)  RR: 17 (23 Jan 2020 12:42) (17 - 18)  SpO2: 99% (23 Jan 2020 12:42) (99% - 99%)    Gen: Appears well in NAD  HEENT:  (-)icterus (-)pallor  CV: N S1 S2 1/6 CORTNEY (+)2 Pulses B/l  Resp:  Clear to auscultation B/L, normal effort  GI: (+) BS Soft, NT, ND  Lymph:  (-)Edema, (-)obvious lymphadenopathy  Skin: Warm to touch, Normal turgor  Psych: Appropriate mood and affect    TELEMETRY: 	SR, no events     DIAGNOSTICS:    < from: Transthoracic Echocardiogram (01.16.20 @ 16:35) >  CONCLUSIONS:  1. Normal mitral valve. Minimal mitral regurgitation.  2. Normal left ventricular internal dimensions and wall  thicknesses.  3. Endocardium not well visualized; grossly normal left  ventricular systolic function.  4. The right ventricle is not well visualized; grossly  normal right ventricular systolic function.  ------------------------------------------------------------------------  Confirmed on  1/16/2020 - 17:54:57 by Lawrence Ness M.D.    < from: MR Cardiac w/wo IV Cont (01.21.20 @ 14:08) >  IMPRESSION:     1.  The LV is normal in size with qualitatively normal systolic function.    2.  Patchy subepicardial late gadolinium enhancement within the inferoseptum at the mid-level to apex which can represent scar or fibrosis secondary to nonischemic cardiomyopathy such as sarcoidosis. No evidence of ARVD or amyloidosis.  < end of copied text >      ASSESSMENT/PLAN: 	    54 year old female PMH DM, and 1 week ago while in [place] patient suffered a reported VF arrest, was shocked and resuscitated, hospitalized with reportedly normal Cardiac cath, left AMA and came to [place] for further care, presented today with palpitations while at work sitting at her desk with heart rates recorded on her apple watch up to 185bpm.     --pt without cp, or anginal symptoms   --echo as noted   --cardiac MRI noted above  --all consultants appreciated  --CT CAP noted  --hx VF arrest, , EP f/u appreciated , plan for ICD Friday  --on d/c pt to follow with Dr. Hines and Dr. Arce

## 2020-01-23 NOTE — PROGRESS NOTE ADULT - SUBJECTIVE AND OBJECTIVE BOX
INTERVAL HPI/OVERNIGHT EVENTS:  No events overnight, getting ICD tomorrow.    MEDICATIONS  (STANDING):  artificial tears (preservative free) Ophthalmic Solution 1 Drop(s) Both EYES two times a day  dextrose 5%. 1000 milliLiter(s) (50 mL/Hr) IV Continuous <Continuous>  dextrose 50% Injectable 12.5 Gram(s) IV Push once  dextrose 50% Injectable 25 Gram(s) IV Push once  dextrose 50% Injectable 25 Gram(s) IV Push once  heparin  Injectable 5000 Unit(s) SubCutaneous every 8 hours  insulin lispro (HumaLOG) corrective regimen sliding scale   SubCutaneous three times a day before meals  lisinopril 5 milliGRAM(s) Oral daily  pantoprazole    Tablet 40 milliGRAM(s) Oral before breakfast    MEDICATIONS  (PRN):  dextrose 40% Gel 15 Gram(s) Oral once PRN Blood Glucose LESS THAN 70 milliGRAM(s)/deciliter  glucagon  Injectable 1 milliGRAM(s) IntraMuscular once PRN Glucose LESS THAN 70 milligrams/deciliter    Vital Signs Last 24 Hrs  T(C): 36.9 (2020 12:42), Max: 36.9 (2020 12:42)  T(F): 98.5 (2020 12:42), Max: 98.5 (2020 12:42)  HR: 73 (2020 12:42) (73 - 98)  BP: 119/61 (2020 12:42) (116/74 - 123/68)  BP(mean): --  RR: 17 (2020 12:42) (17 - 18)  SpO2: 99% (2020 12:42) (99% - 99%)    PHYSICAL EXAM:  General: No apparent distress  HEENT: EOMI, MMM, no oral/nasal ulcers  CVS: RRR, no murmurs/rubs/gallops  Resp: CTA b/l. No crackles/wheezing  GI: Soft, NT/ND +BS  MSK: No synovitis  Neuro: AAOx3  Skin: no visible rashes, no EN    LABS:                        9.9    3.12  )-----------( 290      ( 2020 06:29 )             33.6     -    138  |  103  |  13  ----------------------------<  134<H>  4.4   |  23  |  0.75    Ca    9.6      2020 06:29  Phos  3.2       Mg     2.0     -      PT/INR - ( 2020 06:39 )   PT: 11.6 SEC;   INR: 1.02          PTT - ( 2020 06:39 )  PTT:29.1 SEC  Urinalysis Basic - ( 2020 08:17 )    Color: LIGHT YELLOW / Appearance: CLEAR / S.023 / pH: 6.5  Gluc: NEGATIVE / Ketone: NEGATIVE  / Bili: NEGATIVE / Urobili: NORMAL   Blood: NEGATIVE / Protein: NEGATIVE / Nitrite: NEGATIVE   Leuk Esterase: NEGATIVE / RBC: x / WBC x   Sq Epi: x / Non Sq Epi: x / Bacteria: x

## 2020-01-23 NOTE — PROGRESS NOTE ADULT - ASSESSMENT
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 Rheumatology consulted for evaluation of sarcoidosis. Patient with no obvious extracardiac manifestations of sarcoidosis, such as pulmonary, neurologic, lacrimal, cutaneous or ophthalmologic involvement.   Pt with ventricular tachycardia likely related to underlying infiltrative process. Reviewed imaging with radiology - scarring is non-specific and can be related to any infiltrative process.     Spoke to EP Dr. Arce - agrees that unlikely that pt has sarcoidosis in the absence of other extracardiac involvement. Pt will be getting ICD tomorrow and then will be following up outpatient with him. HE will get FDG-PET outpatient so assess activity vs. chronicity.   Discussed plan with patient. Serologies pending. Asked patient to make follow up with me within 2-3 weeks of discharge.    Dr. Jaenen Dowling  [phone] .

## 2020-01-23 NOTE — PROGRESS NOTE ADULT - SUBJECTIVE AND OBJECTIVE BOX
Patient is a 54y old  Female who presents with a chief complaint of Palpitations and dizziness (2020 12:15)      SUBJECTIVE / OVERNIGHT EVENTS: overnight events noted    ROS:  Resp: No cough no sputum production  CVS: No chest pain no palpitations no orthopnea  GI: no N/V/D  : no dysuria, no hematuria  Neuro: no weakness no paresthesias  Heme: No petechiae no easy bruising  Msk: No joint pain no swelling  Skin: No rash no itching        MEDICATIONS  (STANDING):  dextrose 5%. 1000 milliLiter(s) (50 mL/Hr) IV Continuous <Continuous>  dextrose 50% Injectable 12.5 Gram(s) IV Push once  dextrose 50% Injectable 25 Gram(s) IV Push once  dextrose 50% Injectable 25 Gram(s) IV Push once  heparin  Injectable 5000 Unit(s) SubCutaneous every 8 hours  insulin lispro (HumaLOG) corrective regimen sliding scale   SubCutaneous three times a day before meals  lisinopril 5 milliGRAM(s) Oral daily  pantoprazole    Tablet 40 milliGRAM(s) Oral before breakfast    MEDICATIONS  (PRN):  dextrose 40% Gel 15 Gram(s) Oral once PRN Blood Glucose LESS THAN 70 milliGRAM(s)/deciliter  glucagon  Injectable 1 milliGRAM(s) IntraMuscular once PRN Glucose LESS THAN 70 milligrams/deciliter        CAPILLARY BLOOD GLUCOSE      POCT Blood Glucose.: 138 mg/dL (2020 08:43)  POCT Blood Glucose.: 155 mg/dL (2020 22:24)  POCT Blood Glucose.: 217 mg/dL (2020 17:42)  POCT Blood Glucose.: 121 mg/dL (2020 12:44)    I&O's Summary      Vital Signs Last 24 Hrs  T(C): 36.8 (2020 04:56), Max: 36.8 (2020 04:56)  T(F): 98.2 (2020 04:56), Max: 98.2 (2020 04:56)  HR: 98 (2020 04:56) (92 - 98)  BP: 123/68 (2020 04:56) (116/74 - 128/77)  BP(mean): --  RR: 18 (2020 04:56) (18 - 18)  SpO2: 99% (2020 04:56) (99% - 100%)    PHYSICAL EXAM:  GENERAL: in no apparent distress  HEAD:  Atraumatic, Normocephalic  EYES: EOMI, PERRLA, conjunctiva and sclera clear  NECK: Supple, No JVD  CHEST/LUNG: no wheeze, clear to auscultation bilaterally  HEART: S1 S2; No rubs or gallops, no murmurs appreciated  ABDOMEN: Soft, Nontender, Nondistended; Bowel sounds present  EXTREMITIES:  No clubbing or cyanosis, + Peripheral Pulses,  no edema  PSYCH: AO x 3 appropriate affect  NEUROLOGY: non-focal, motor and sensory systems intact  SKIN: No rashes or lesions    LABS:                        9.9    3.12  )-----------( 290      ( 2020 06:29 )             33.6     -    138  |  103  |  13  ----------------------------<  134<H>  4.4   |  23  |  0.75    Ca    9.6      2020 06:29  Phos  3.2       Mg     2.0     23      PT/INR - ( 2020 06:39 )   PT: 11.6 SEC;   INR: 1.02          PTT - ( 2020 06:39 )  PTT:29.1 SEC      Urinalysis Basic - ( 2020 08:17 )    Color: LIGHT YELLOW / Appearance: CLEAR / S.023 / pH: 6.5  Gluc: NEGATIVE / Ketone: NEGATIVE  / Bili: NEGATIVE / Urobili: NORMAL   Blood: NEGATIVE / Protein: NEGATIVE / Nitrite: NEGATIVE   Leuk Esterase: NEGATIVE / RBC: x / WBC x   Sq Epi: x / Non Sq Epi: x / Bacteria: x          All consultant(s) notes reviewed and care discussed with other providers        Contact Number, Dr Hsieh [phone]

## 2020-01-23 NOTE — PROGRESS NOTE ADULT - PROBLEM SELECTOR PLAN 3
work up as per cardiology attending  echocardiogram normal  MRI cardiac noted   defer to EP and cardiology attending  likely AICD tomorrow

## 2020-01-23 NOTE — CONSULT NOTE ADULT - SUBJECTIVE AND OBJECTIVE BOX
[facility] Ophthalmology Consult Note    HPI:      PMH: None  Meds: None  POcHx (including surgeries/lasers/trauma):  None  Drops: None  FamHx: None  Social Hx: None  Allergies: NKDA    ROS:  General (neg), Vision (per HPI), Head and Neck (neg), Pulm (neg), CV (neg), GI (neg),  (neg), Musculoskeletal (neg), Skin/Integ (neg), Neuro (neg), Endocrine (neg), Heme (neg), All/Immuno (neg)    Mood and Affect Appropriate ( x ),  Oriented to Time, Place, and Person x 3 ( x )    Ophthalmology Exam    Visual acuity (cc near card): 20/20 OU  Pupils: PERRL OU, no APD  Ttono: 12 OD, 11 OS  Extraocular movements (EOMs): Full OU, no pain, no diplopia   Confrontational Visual Field (CVF):  Full OU  Color Plates: 12/12 OU    Pen Light Exam (PLE)  External:  Flat OU  Lids/Lashes/Lacrimal Ducts: Flat OU    Sclera/Conjunctiva:  W+Q OU  Cornea: Clear OU  Anterior Chamber: D+F OU  Iris:  Flat OU  Lens:  NS OU    Fundus Exam: dilated with 1% tropicamide and 2.5% phenylephrine  Approval obtained from primary team for dilation  Patient aware that pupils can remained dilated for at least 4-6 hours  Exam performed with 20D lens    Vitreous: wnl OU  Disc, cup/disc: sharp and pink, 0.5 OU  Macula:  wnl OU  Vessels:  wnl OU  Periphery: wnl OU    Assessment: Normal eye exam, blurred vision OS likely 2/2 shifting toric CL vs dry eyes.      Plan:  - no acute ophthalmologic intervention  - start preservative free artificial tears 1 drop BID both eyes  - BP and BG control  - outpatient glaucoma and diabetic screening (has private ophthalmologist)  - appreciate rheum recs regarding sarcoidosis  - rest of workup per primary team  - plan discussed with patient and primary team        Follow-Up:  Patient should follow up his/her ophthalmologist or in the [facility] Ophthalmology Practice within 1 week of discharge.  [address] [place] [address] [phone]    S/D/W Dr Durant (attending) [facility] Ophthalmology Consult Note    HPI: 54-year-old F with PMHx DM, HTN, sleeve gastrectomy, cardiac arrest 2 weeks prior to admission with normal cath reported in [place] and POcHx CL wearer with astigmatism who presented with dizziness and palpitations, found to have HRs to 180s, admitted for cardiac workup where cardiac MRI shows nonischemic cardiomyopathy possible sarcoidosis, ophthalmology consulted to rule out ocular sarcoidosis. Patient states has intermittent "film" over left eye for 2-5 seconds once a day for past week that goes away after blinking otherwise no blurred vision, no photophobia, no eye pain, no red eyes, no new flashes/floaters, no double vision.    PMHx: As above  Meds:   · 	Glucophage 1000 mg oral tablet: 1 tab(s) orally 2 times a day  · 	Onglyza 5 mg oral tablet: 1 tab(s) orally once a day          · 	lisinopril 5 mg oral tablet: 1 tab(s) orally once a day  POcHx (including surgeries/lasers/trauma):  astigmatism, CL use (dailies), annual screening for diabetic retinopathy  Drops: AT PRN  FamHx: None  Social Hx: None  Allergies: NKDA    ROS:  General (neg), Vision (per HPI), Head and Neck (neg), Pulm (neg), CV (neg), GI (neg),  (neg), Musculoskeletal (neg), Skin/Integ (neg), Neuro (neg), Endocrine (neg), Heme (neg), All/Immuno (neg)    Mood and Affect Appropriate ( x ),  Oriented to Time, Place, and Person x 3 ( x )    Ophthalmology Exam    Visual acuity (cc near card): 20/20 OU  Pupils: PERRL OU, no APD  Ttono: 12 OD, 11 OS  Extraocular movements (EOMs): Full OU, no pain, no diplopia   Confrontational Visual Field (CVF):  Full OU  Color Plates: 12/12 OU    Pen Light Exam (PLE)  External:  Flat OU  Lids/Lashes/Lacrimal Ducts: Flat OU    Sclera/Conjunctiva:  W+Q OU  Cornea: Clear OU  Anterior Chamber: D+F OU  Iris:  Flat OU  Lens:  NS OU    Fundus Exam: dilated with 1% tropicamide and 2.5% phenylephrine  Approval obtained from primary team for dilation  Patient aware that pupils can remained dilated for at least 4-6 hours  Exam performed with 20D lens    Vitreous: wnl OU  Disc, cup/disc: sharp and pink, 0.5 OU  Macula:  wnl OU  Vessels:  wnl OU  Periphery: wnl OU    < from: MR Cardiac w/wo IV Cont (01.21.20 @ 14:08) >    EXAM:  MR CARD MORPH FUNCTION WAW IC        PROCEDURE DATE:  Jan 21 2020     INTERPRETATION:    CLINICAL INDICATION: Abnormal EKG, cardiac arrest, normal heart catheterization- r/o sarcoid or ARVD, amyloid    COMPARISON: None    TECHNIQUE:  Using a torso phased-array coil, multiplanar short axis and two-, three- and four-chamber long axis views of the heart were obtained using cine imaging to assess wall motion. MR imaging was performed with a 3D gradient echo technique after the uneventfuladministration of 10 ml of gadolinium-based contrast agent (Gadavist). Multiplanar delayed enhancement inversion recovery images were obtained approximately 10-15 minutes following the administration of the gadolinium agent.    Postprocessing including measurement of cardiac functional parameters was performed on an independent workstation.      FINDINGS:      The left ventricle is qualitatively normal in size with normal systolic function.     There is normal right ventricular size and normal global systolic function. There is no fatty infiltration of the RV wall. No delayed enhancement is seen within the right ventricular myocardium. There are no regional wall motion abnormalities or focal aneurysmal motion.    Mild prolapse of the anteriormitral leaflet of the mitral valve. The left atrium is normal in size. There is qualitatively mitral regurgitation.    The right atrium is normal in size.  There is qualitatively no significant tricuspid regurgitation.    Qualitatively, there is no significant aortic regurgitation and no significant aortic stenosis.     The thoracic aorta is normal in diameter.    No pericardial effusion.    LEFT VENTRICULAR (LV) REGIONAL FUNCTION     LV regional function demonstrates no segmental wall motion abnormality.    LATE GADOLINIUM ENHANCEMENT (LGE)     On delayed contrast enhanced images, patchy subepicardial late gadolinium enhancement within the inferoseptum at the mid-level to apex.       QUANTITATIVE ANALYSIS     Quantitative functional parameters was not obtained secondary to technical factors.     Additional findings: None. Please note this examination is focused on the heart.       IMPRESSION:     1.  The LV is normal in size with qualitatively normal systolic function.    2.  Patchy subepicardial late gadolinium enhancement within the inferoseptum at the mid-level to apex which can represent scar or fibrosis secondary to nonischemic cardiomyopathy such as sarcoidosis. No evidence of ARVD or amyloidosis.    LAMONT VASQUEZ, ATTENDING RADIOLOGIST  This document has been electronically signed. Jan 21 2020  3:10PM      < end of copied text >      Assessment: 54-year-old F with PMHx DM, HTN, sleeve gastrectomy, cardiac arrest 2 weeks prior to admission with normal cath reported in [place] and POcHx CL wearer with astigmatism who presented with dizziness and palpitations, found to have HRs to 180s, admitted for cardiac workup where cardiac MRI shows nonischemic cardiomyopathy possible sarcoidosis, ophthalmology consulted to rule out ocular sarcoidosis. BCVA 20/20 OU, no APD, IOP WNL, CVF and color full, EOM full. Anterior exam WNL, no injection OU. DFE WNL other than 0.5 CDR OU with large overall optic nerves (can be normal anatomic variant).    Blurred vision OS likely 2/2 shifting toric CL vs dry eyes.    Plan:  - no acute ophthalmologic intervention  - start preservative free artificial tears 1 drop BID both eyes  - BP and BG control  - outpatient glaucoma and diabetic screening (has private ophthalmologist)  - appreciate rheum recs regarding sarcoidosis  - rest of workup per primary team  - plan discussed with patient and primary team    Follow-Up:  Patient should follow up with her ophthalmologist or in the [facility] Ophthalmology Practice within 1 week of discharge.  [address] [place] [address] [phone]    S/D/W Dr Durant (attending)

## 2020-01-23 NOTE — PROGRESS NOTE ADULT - SUBJECTIVE AND OBJECTIVE BOX
S:  no syncope, dizziness, lightheadedness, no cp or anginal symptoms. ROS otherwise -      	  dextrose 40% Gel 15 Gram(s) Oral once PRN  dextrose 5%. 1000 milliLiter(s) IV Continuous <Continuous>  dextrose 50% Injectable 12.5 Gram(s) IV Push once  dextrose 50% Injectable 25 Gram(s) IV Push once  dextrose 50% Injectable 25 Gram(s) IV Push once  glucagon  Injectable 1 milliGRAM(s) IntraMuscular once PRN  heparin  Injectable 5000 Unit(s) SubCutaneous every 8 hours  insulin lispro (HumaLOG) corrective regimen sliding scale   SubCutaneous three times a day before meals  lisinopril 5 milliGRAM(s) Oral daily  pantoprazole    Tablet 40 milliGRAM(s) Oral before breakfast                         9.9    3.12  )-----------( 290      ( 23 Jan 2020 06:29 )             33.6     Hemoglobin: 9.9 g/dL (01-23 @ 06:29)  Hemoglobin: 10.5 g/dL (01-22 @ 06:39)  Hemoglobin: 10.1 g/dL (01-21 @ 07:14)  Hemoglobin: 10.7 g/dL (01-20 @ 06:23)  Hemoglobin: 10.8 g/dL (01-19 @ 06:07)      01-23    138  |  103  |  13  ----------------------------<  134<H>  4.4   |  23  |  0.75    Ca    9.6      23 Jan 2020 06:29  Phos  3.2     01-22  Mg     2.0     01-23      Creatinine Trend: 0.75<--, 0.75<--, 0.72<--, 0.70<--, 0.70<--, 0.69<--    COAGS:       PHYSICAL EXAM:  Vital Signs last 24 Hours   T(C): 36.8 (01-23-20 @ 04:56), Max: 36.8 (01-23-20 @ 04:56)  HR: 98 (01-23-20 @ 04:56) (92 - 98)  BP: 123/68 (01-23-20 @ 04:56) (116/74 - 123/68)  RR: 18 (01-23-20 @ 04:56) (18 - 18)  SpO2: 99% (01-23-20 @ 04:56) (99% - 99%)  Wt(kg): --    I&O's Summary    A&O x 3  neurologically intact  no JVD  RRR, no murmurs  CTAB  soft nt/nd  no c/c/e    TELEMETRY:  NSR     DIAGNOSTICS:    < from: CT Chest No Cont (01.22.20 @ 15:27) >  IMPRESSION:     No evidence of suggest pulmonary manifestations of sarcoidosis.     No evidence of thoracic, abdominal, or pelvic lymphadenopathy.    PACO CHANDLER M.D., RADIOLOGY RESIDENT  This document has been electronically signed.  KATEY WOOTEN M.D., ATTENDING RADIOLOGIST  This document has been electronically signed. 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 A cath was normal, and then she left AMA to pursue a further workup here in [place]. Echo here is unremarkable. Her baseline EKG has subtle late notching in the inferior leads - her signs and symptoms are highly concerning for an infiltrative cardiomyopathy (not a channelopathy) such as ARVD or cardiac sarcoid or amyloid. 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 Arce 2/6/20 at 1:40 f/u hosp and Dr. Hines 1/31/20 at 1:30 pm after discharge  -f/u with her PMD Iggy Carbone at [facility] after discharge

## 2020-01-23 NOTE — PROGRESS NOTE ADULT - SUBJECTIVE AND OBJECTIVE BOX
Subjective: No chest pain or sob; ROS otherwise negative.     dextrose 40% Gel 15 Gram(s) Oral once PRN  dextrose 5%. 1000 milliLiter(s) IV Continuous <Continuous>  dextrose 50% Injectable 12.5 Gram(s) IV Push once  dextrose 50% Injectable 25 Gram(s) IV Push once  dextrose 50% Injectable 25 Gram(s) IV Push once  glucagon  Injectable 1 milliGRAM(s) IntraMuscular once PRN  heparin  Injectable 5000 Unit(s) SubCutaneous every 8 hours  insulin lispro (HumaLOG) corrective regimen sliding scale   SubCutaneous three times a day before meals  lisinopril 5 milliGRAM(s) Oral daily  pantoprazole    Tablet 40 milliGRAM(s) Oral before breakfast                            9.9    3.12  )-----------( 290      ( 23 Jan 2020 06:29 )             33.6       01-23    138  |  103  |  13  ----------------------------<  134<H>  4.4   |  23  |  0.75    Ca    9.6      23 Jan 2020 06:29  Phos  3.2     01-22  Mg     2.0     01-23              T(C): 36.9 (01-23-20 @ 12:42), Max: 36.9 (01-23-20 @ 12:42)  HR: 73 (01-23-20 @ 12:42) (73 - 98)  BP: 119/61 (01-23-20 @ 12:42) (116/74 - 123/68)  RR: 17 (01-23-20 @ 12:42) (17 - 18)  SpO2: 99% (01-23-20 @ 12:42) (99% - 99%)  Wt(kg): --    I&O's Summary      Appearance: Normal	  HEENT:   Normal oral mucosa, PERRL, EOMI	  Lymphatic: No lymphadenopathy , no edema  Cardiovascular: Normal S1 S2, No JVD, No murmurs , Peripheral pulses palpable 2+ bilaterally  Respiratory: Lungs clear to auscultation, normal effort 	  Gastrointestinal:  Soft, Non-tender, + BS	  Skin: No rashes, No ecchymoses, No cyanosis, warm to touch  Musculoskeletal: Normal range of motion, normal strength  Psychiatry:  Mood & affect appropriate    TELEMETRY: SR	        DIAGNOSTIC  < from: Transthoracic Echocardiogram (01.16.20 @ 16:35) >  OBSERVATIONS:  Mitral Valve: Normal mitral valve. Minimal mitral  regurgitation.  Aortic Root: Normal aortic root.  Aortic Valve: Normal trileaflet aortic valve.  Left Atrium: Normal left atrium.  Left Ventricle: Endocardium not well visualized; grossly  normal left ventricular systolic function. Normal left  ventricular internal dimensions and wall thicknesses.  Right Heart: Normal right atrium. The right ventricle is  not well visualized; grossly normal right ventricular  systolic function. Normal tricuspid valve. Minimal  tricuspid regurgitation. Normal pulmonic valve.  Pericardium/PleuraNormal pericardium with no pericardial  effusion.  ------------------------------------------------------------------------  CONCLUSIONS:  1. Normal mitral valve. Minimal mitral regurgitation.  2. Normal left ventricular internal dimensions and wall  thicknesses.  3. Endocardium not well visualized; grossly normal left  ventricular systolic function.  4. The right ventricle is not well visualized; grossly  normal right ventricular systolic function.    < end of copied text >      ASSESSMENT/PLAN: 	54y Female with history of DM, history of cardiac arrest this month in [place] admitted with palpitations.     -Monitor on tele to rule out malignant arrhythmias  -No significant CAD seen at outside hospital cath per their records   -EP follow up appreciated - AICD for secondary prevention   -TTE with normal BI-V function no valve disease   -No evidence of sarcoid on CT   -Rheum consult appreciated - rheum workup in progress  -Optho eval  -No repeat ischemic eval needed at this time     Allison Dennis MD

## 2020-01-23 NOTE — PROGRESS NOTE ADULT - SUBJECTIVE AND OBJECTIVE BOX
Patient is a 54y old  Female who presents with a chief complaint of Palpitations and dizziness (2020 12:15)    Asymptomatic  Any change in ROS:     MEDICATIONS  (STANDING):  dextrose 5%. 1000 milliLiter(s) (50 mL/Hr) IV Continuous <Continuous>  dextrose 50% Injectable 12.5 Gram(s) IV Push once  dextrose 50% Injectable 25 Gram(s) IV Push once  dextrose 50% Injectable 25 Gram(s) IV Push once  heparin  Injectable 5000 Unit(s) SubCutaneous every 8 hours  insulin lispro (HumaLOG) corrective regimen sliding scale   SubCutaneous three times a day before meals  lisinopril 5 milliGRAM(s) Oral daily  pantoprazole    Tablet 40 milliGRAM(s) Oral before breakfast    MEDICATIONS  (PRN):  dextrose 40% Gel 15 Gram(s) Oral once PRN Blood Glucose LESS THAN 70 milliGRAM(s)/deciliter  glucagon  Injectable 1 milliGRAM(s) IntraMuscular once PRN Glucose LESS THAN 70 milligrams/deciliter    Vital Signs Last 24 Hrs  T(C): 36.8 (2020 04:56), Max: 36.8 (2020 04:56)  T(F): 98.2 (2020 04:56), Max: 98.2 (2020 04:56)  HR: 98 (2020 04:56) (92 - 98)  BP: 123/68 (2020 04:56) (116/74 - 128/77)  BP(mean): --  RR: 18 (2020 04:56) (18 - 18)  SpO2: 99% (2020 04:56) (99% - 100%)    I&O's Summary        Physical Exam:   GENERAL: NAD, well-groomed, well-developed  HEENT: SILVIANO/   Atraumatic, Normocephalic  ENMT: No tonsillar erythema, exudates, or enlargement; Moist mucous membranes, Good dentition, No lesions  NECK: Supple, No JVD, Normal thyroid  CHEST/LUNG: Clear to auscultaion, ; No rales, rhonchi, wheezing, or rubs  CVS: Regular rate and rhythm; No murmurs, rubs, or gallops  GI: : Soft, Nontender, Nondistended; Bowel sounds present  NERVOUS SYSTEM:  Alert & Oriented X3  EXTREMITIES:  2+ Peripheral Pulses, No clubbing, cyanosis, or edema  LYMPH: No lymphadenopathy noted  SKIN: No rashes or lesions  ENDOCRINOLOGY: No Thyromegaly  PSYCH: Appropriate    Labs:                              9.9    3.12  )-----------( 290      ( 2020 06:29 )             33.6                         10.5   3.23  )-----------( 334      ( 2020 06:39 )             36.3                         10.1   3.14  )-----------( 314      ( 2020 07:14 )             34.5                         10.7   3.39  )-----------( 339      ( 2020 06:23 )             36.1     01-23    138  |  103  |  13  ----------------------------<  134<H>  4.4   |  23  |  0.75  01-22    137  |  103  |  14  ----------------------------<  142<H>  4.5   |  24  |  0.75  01-21    138  |  104  |  15  ----------------------------<  130<H>  4.4   |  25  |  0.72  01-20    137  |  103  |  15  ----------------------------<  123<H>  4.2   |  25  |  0.70    Ca    9.6      2020 06:29  Ca    9.6      2020 06:39  Phos  3.2     01-22  Mg     2.0     01-23  Mg     1.9     -22      CAPILLARY BLOOD GLUCOSE      POCT Blood Glucose.: 138 mg/dL (2020 08:43)  POCT Blood Glucose.: 155 mg/dL (2020 22:24)  POCT Blood Glucose.: 217 mg/dL (2020 17:42)  POCT Blood Glucose.: 121 mg/dL (2020 12:44)        PT/INR - ( 2020 06:39 )   PT: 11.6 SEC;   INR: 1.02          PTT - ( 2020 06:39 )  PTT:29.1 SEC  Urinalysis Basic - ( 2020 08:17 )    Color: LIGHT YELLOW / Appearance: CLEAR / S.023 / pH: 6.5  Gluc: NEGATIVE / Ketone: NEGATIVE  / Bili: NEGATIVE / Urobili: NORMAL   Blood: NEGATIVE / Protein: NEGATIVE / Nitrite: NEGATIVE   Leuk Esterase: NEGATIVE / RBC: x / WBC x   Sq Epi: x / Non Sq Epi: x / Bacteria: x      < from: CT Chest No Cont (20 @ 15:27) >  AND YOUNG: No lymphadenopathy.  CHEST WALL AND LOWER NECK: Within normal limits.    ABDOMEN AND PELVIS:    LIVER: Within normal limits.  BILE DUCTS: Normal caliber.  GALLBLADDER: Within normal limits.  SPLEEN: Within normal limits.  PANCREAS: Within normal limits.  ADRENALS: Within normal limits.  KIDNEYS/URETERS: Punctate left lower pole calculus. No hydronephrosis.    BLADDER: Within normal limits.  REPRODUCTIVE ORGANS: The endometrium measures up to 9 mm. No solid adnexal mass.    BOWEL: Small hiatal hernia. Sleeve gastrectomy. No bowel obstruction. Appendix within normal limits. Colonic diverticulosis.  PERITONEUM:No ascites.  VESSELS: Within normal limits.  RETROPERITONEUM/LYMPH NODES: No lymphadenopathy.    ABDOMINAL WALL: Umbilical hernia containing fat and bowel.  BONES: Chronic bilateral rib fractures. Degenerative changes of the spine.    IMPRESSION:     No evidence of suggest pulmonary manifestations of sarcoidosis.     No evidence of thoracic, abdominal, or pelvic lymphadenopathy.          < end of copied text >        RECENT CULTURES:        RESPIRATORY CULTURES:          Studies  Chest X-RAY  CT SCAN Chest   Venous Dopplers: LE:   CT Abdomen  Others

## 2020-01-24 LAB
ACE SERPL-CCNC: 29 U/L — SIGNIFICANT CHANGE UP (ref 14–82)
ANION GAP SERPL CALC-SCNC: 9 MMO/L — SIGNIFICANT CHANGE UP (ref 7–14)
ANION GAP SERPL CALC-SCNC: 9 MMO/L — SIGNIFICANT CHANGE UP (ref 7–14)
APTT BLD: 28 SEC — SIGNIFICANT CHANGE UP (ref 27.5–36.3)
BASOPHILS # BLD AUTO: 0.03 K/UL — SIGNIFICANT CHANGE UP (ref 0–0.2)
BASOPHILS NFR BLD AUTO: 0.9 % — SIGNIFICANT CHANGE UP (ref 0–2)
BLD GP AB SCN SERPL QL: NEGATIVE — SIGNIFICANT CHANGE UP
BUN SERPL-MCNC: 11 MG/DL — SIGNIFICANT CHANGE UP (ref 7–23)
BUN SERPL-MCNC: 11 MG/DL — SIGNIFICANT CHANGE UP (ref 7–23)
CALCIUM SERPL-MCNC: 9.5 MG/DL — SIGNIFICANT CHANGE UP (ref 8.4–10.5)
CALCIUM SERPL-MCNC: 9.5 MG/DL — SIGNIFICANT CHANGE UP (ref 8.4–10.5)
CHLORIDE SERPL-SCNC: 103 MMOL/L — SIGNIFICANT CHANGE UP (ref 98–107)
CHLORIDE SERPL-SCNC: 103 MMOL/L — SIGNIFICANT CHANGE UP (ref 98–107)
CO2 SERPL-SCNC: 24 MMOL/L — SIGNIFICANT CHANGE UP (ref 22–31)
CO2 SERPL-SCNC: 24 MMOL/L — SIGNIFICANT CHANGE UP (ref 22–31)
CREAT SERPL-MCNC: 0.68 MG/DL — SIGNIFICANT CHANGE UP (ref 0.5–1.3)
CREAT SERPL-MCNC: 0.68 MG/DL — SIGNIFICANT CHANGE UP (ref 0.5–1.3)
EOSINOPHIL # BLD AUTO: 0.08 K/UL — SIGNIFICANT CHANGE UP (ref 0–0.5)
EOSINOPHIL NFR BLD AUTO: 2.4 % — SIGNIFICANT CHANGE UP (ref 0–6)
GLUCOSE BLDC GLUCOMTR-MCNC: 109 MG/DL — HIGH (ref 70–99)
GLUCOSE BLDC GLUCOMTR-MCNC: 119 MG/DL — HIGH (ref 70–99)
GLUCOSE BLDC GLUCOMTR-MCNC: 225 MG/DL — HIGH (ref 70–99)
GLUCOSE BLDC GLUCOMTR-MCNC: 89 MG/DL — SIGNIFICANT CHANGE UP (ref 70–99)
GLUCOSE SERPL-MCNC: 134 MG/DL — HIGH (ref 70–99)
GLUCOSE SERPL-MCNC: 134 MG/DL — HIGH (ref 70–99)
HCG UR-SCNC: NEGATIVE — SIGNIFICANT CHANGE UP
HCT VFR BLD CALC: 34.5 % — SIGNIFICANT CHANGE UP (ref 34.5–45)
HCT VFR BLD CALC: 34.5 % — SIGNIFICANT CHANGE UP (ref 34.5–45)
HGB BLD-MCNC: 10.1 G/DL — LOW (ref 11.5–15.5)
HGB BLD-MCNC: 10.1 G/DL — LOW (ref 11.5–15.5)
IMM GRANULOCYTES NFR BLD AUTO: 0.3 % — SIGNIFICANT CHANGE UP (ref 0–1.5)
INR BLD: 0.97 — SIGNIFICANT CHANGE UP (ref 0.88–1.17)
LYMPHOCYTES # BLD AUTO: 1.31 K/UL — SIGNIFICANT CHANGE UP (ref 1–3.3)
LYMPHOCYTES # BLD AUTO: 40.1 % — SIGNIFICANT CHANGE UP (ref 13–44)
MAGNESIUM SERPL-MCNC: 2.1 MG/DL — SIGNIFICANT CHANGE UP (ref 1.6–2.6)
MCHC RBC-ENTMCNC: 21.7 PG — LOW (ref 27–34)
MCHC RBC-ENTMCNC: 21.7 PG — LOW (ref 27–34)
MCHC RBC-ENTMCNC: 29.3 % — LOW (ref 32–36)
MCHC RBC-ENTMCNC: 29.3 % — LOW (ref 32–36)
MCV RBC AUTO: 74.2 FL — LOW (ref 80–100)
MCV RBC AUTO: 74.2 FL — LOW (ref 80–100)
MONOCYTES # BLD AUTO: 0.34 K/UL — SIGNIFICANT CHANGE UP (ref 0–0.9)
MONOCYTES NFR BLD AUTO: 10.4 % — SIGNIFICANT CHANGE UP (ref 2–14)
NEUTROPHILS # BLD AUTO: 1.5 K/UL — LOW (ref 1.8–7.4)
NEUTROPHILS NFR BLD AUTO: 45.9 % — SIGNIFICANT CHANGE UP (ref 43–77)
NRBC # FLD: 0 K/UL — SIGNIFICANT CHANGE UP (ref 0–0)
NRBC # FLD: 0 K/UL — SIGNIFICANT CHANGE UP (ref 0–0)
PLATELET # BLD AUTO: 292 K/UL — SIGNIFICANT CHANGE UP (ref 150–400)
PLATELET # BLD AUTO: 292 K/UL — SIGNIFICANT CHANGE UP (ref 150–400)
PMV BLD: 10.8 FL — SIGNIFICANT CHANGE UP (ref 7–13)
PMV BLD: 10.8 FL — SIGNIFICANT CHANGE UP (ref 7–13)
POTASSIUM SERPL-MCNC: 4.3 MMOL/L — SIGNIFICANT CHANGE UP (ref 3.5–5.3)
POTASSIUM SERPL-MCNC: 4.3 MMOL/L — SIGNIFICANT CHANGE UP (ref 3.5–5.3)
POTASSIUM SERPL-SCNC: 4.3 MMOL/L — SIGNIFICANT CHANGE UP (ref 3.5–5.3)
POTASSIUM SERPL-SCNC: 4.3 MMOL/L — SIGNIFICANT CHANGE UP (ref 3.5–5.3)
PROTHROM AB SERPL-ACNC: 11.1 SEC — SIGNIFICANT CHANGE UP (ref 9.8–13.1)
RBC # BLD: 4.65 M/UL — SIGNIFICANT CHANGE UP (ref 3.8–5.2)
RBC # BLD: 4.65 M/UL — SIGNIFICANT CHANGE UP (ref 3.8–5.2)
RBC # FLD: 17.2 % — HIGH (ref 10.3–14.5)
RBC # FLD: 17.2 % — HIGH (ref 10.3–14.5)
RH IG SCN BLD-IMP: NEGATIVE — SIGNIFICANT CHANGE UP
SODIUM SERPL-SCNC: 136 MMOL/L — SIGNIFICANT CHANGE UP (ref 135–145)
SODIUM SERPL-SCNC: 136 MMOL/L — SIGNIFICANT CHANGE UP (ref 135–145)
SP GR UR: 1.02 — SIGNIFICANT CHANGE UP (ref 1–1.04)
WBC # BLD: 3.27 K/UL — LOW (ref 3.8–10.5)
WBC # BLD: 3.27 K/UL — LOW (ref 3.8–10.5)
WBC # FLD AUTO: 3.27 K/UL — LOW (ref 3.8–10.5)
WBC # FLD AUTO: 3.27 K/UL — LOW (ref 3.8–10.5)

## 2020-01-24 PROCEDURE — 93010 ELECTROCARDIOGRAM REPORT: CPT | Mod: 77

## 2020-01-24 PROCEDURE — 71045 X-RAY EXAM CHEST 1 VIEW: CPT | Mod: 26

## 2020-01-24 RX ORDER — METOPROLOL TARTRATE 50 MG
25 TABLET ORAL DAILY
Refills: 0 | Status: DISCONTINUED | OUTPATIENT
Start: 2020-01-24 | End: 2020-01-25

## 2020-01-24 RX ORDER — CEFAZOLIN SODIUM 1 G
2000 VIAL (EA) INJECTION EVERY 8 HOURS
Refills: 0 | Status: COMPLETED | OUTPATIENT
Start: 2020-01-24 | End: 2020-01-25

## 2020-01-24 RX ADMIN — Medication 100 MILLIGRAM(S): at 21:08

## 2020-01-24 RX ADMIN — Medication 1 DROP(S): at 05:19

## 2020-01-24 RX ADMIN — PANTOPRAZOLE SODIUM 40 MILLIGRAM(S): 20 TABLET, DELAYED RELEASE ORAL at 05:19

## 2020-01-24 RX ADMIN — LISINOPRIL 5 MILLIGRAM(S): 2.5 TABLET ORAL at 05:19

## 2020-01-24 RX ADMIN — HEPARIN SODIUM 5000 UNIT(S): 5000 INJECTION INTRAVENOUS; SUBCUTANEOUS at 05:19

## 2020-01-24 NOTE — PROVIDER CONTACT NOTE (OTHER) - BACKGROUND
Patient is a 53yo female, hx of cardiac arrest 2 weeks ago after swimming
(Admit Diagnosis) Palpitations  (PMH) Hypertension, unspecified type  (PMH) Cardiac arrest  (PMH) Diabetes
cardiac arrest awaiting icd

## 2020-01-24 NOTE — PROGRESS NOTE ADULT - SUBJECTIVE AND OBJECTIVE BOX
S:  no syncope, dizziness, lightheadedness, no cp or anginal symptoms. ROS otherwise -        artificial tears (preservative free) Ophthalmic Solution 1 Drop(s) Both EYES two times a day  dextrose 40% Gel 15 Gram(s) Oral once PRN  dextrose 5%. 1000 milliLiter(s) IV Continuous <Continuous>  dextrose 50% Injectable 12.5 Gram(s) IV Push once  dextrose 50% Injectable 25 Gram(s) IV Push once  dextrose 50% Injectable 25 Gram(s) IV Push once  glucagon  Injectable 1 milliGRAM(s) IntraMuscular once PRN  heparin  Injectable 5000 Unit(s) SubCutaneous every 8 hours  insulin lispro (HumaLOG) corrective regimen sliding scale   SubCutaneous three times a day before meals  lisinopril 5 milliGRAM(s) Oral daily  pantoprazole    Tablet 40 milliGRAM(s) Oral before breakfast                            10.1   3.27  )-----------( 292      ( 24 Jan 2020 06:13 )             34.5       Hemoglobin: 10.1 g/dL (01-24 @ 06:13)  Hemoglobin: 10.1 g/dL (01-24 @ 06:13)  Hemoglobin: 9.9 g/dL (01-23 @ 06:29)  Hemoglobin: 10.5 g/dL (01-22 @ 06:39)  Hemoglobin: 10.1 g/dL (01-21 @ 07:14)      01-24    136  |  103  |  11  ----------------------------<  134<H>  4.3   |  24  |  0.68    Ca    9.5      24 Jan 2020 06:13  Mg     2.1     01-24      Creatinine Trend: 0.68<--, 0.75<--, 0.75<--, 0.72<--, 0.70<--, 0.70<--    COAGS: PT/INR - ( 24 Jan 2020 06:13 )   PT: 11.1 SEC;   INR: 0.97          PTT - ( 24 Jan 2020 06:13 )  PTT:28.0 SEC      PHYSICAL EXAM:  Vital Signs last 24 Hours   T(C): 36.9 (01-24-20 @ 05:17), Max: 36.9 (01-23-20 @ 12:42)  HR: 65 (01-24-20 @ 05:17) (65 - 77)  BP: 106/60 (01-24-20 @ 05:17) (106/60 - 130/73)  RR: 16 (01-24-20 @ 05:17) (16 - 17)  SpO2: 99% (01-24-20 @ 05:17) (99% - 100%)  Wt(kg): --    I&O's Summary      Gen: Appears well in NAD  HEENT:  (-)icterus (-)pallor  CV: N S1 S2 1/6 CORTNEY (+)2 Pulses B/l  Resp:  Clear to auscultation B/L, normal effort  GI: (+) BS Soft, NT, ND  Lymph:  (-)Edema, (-)obvious lymphadenopathy  Skin: Warm to touch, Normal turgor  Psych: Appropriate mood and affect    TELEMETRY:  NSR     DIAGNOSTICS:    < from: CT Chest No Cont (01.22.20 @ 15:27) >  IMPRESSION:     No evidence of suggest pulmonary manifestations of sarcoidosis.     No evidence of thoracic, abdominal, or pelvic lymphadenopathy.    PACO CHANDLER M.D., RADIOLOGY RESIDENT  This document has been electronically signed.  KATEY WOOTEN M.D., ATTENDING RADIOLOGIST  This document has been electronically signed. 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 A cath was normal, and then she left A to pursue a further workup here in [place]. Echo here is unremarkable. Her baseline EKG has subtle late notching in the inferior leads - her signs and symptoms are highly concerning for an infiltrative cardiomyopathy (not a channelopathy) such as ARVD or cardiac sarcoid or amyloid. 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      -pt without cp, syncope, or anginal symptoms   -CT chest as noted above   -cont tele, NSR   --will not recommend a NOAC until AF is documented on her atrial lead  --plan for dual chamber ICD (Medtronic) today   -pulm and rheum noted   -f/u with Dr. Arce 2/6/20 at 1:40 f/u hosp and Dr. Hines 1/31/20 at 1:30 pm after discharge  -f/u with her PMD Iggy Carbone at [facility] after discharge S:  no syncope, dizziness, lightheadedness, no cp or anginal symptoms. ROS otherwise -        artificial tears (preservative free) Ophthalmic Solution 1 Drop(s) Both EYES two times a day  dextrose 40% Gel 15 Gram(s) Oral once PRN  dextrose 5%. 1000 milliLiter(s) IV Continuous <Continuous>  dextrose 50% Injectable 12.5 Gram(s) IV Push once  dextrose 50% Injectable 25 Gram(s) IV Push once  dextrose 50% Injectable 25 Gram(s) IV Push once  glucagon  Injectable 1 milliGRAM(s) IntraMuscular once PRN  heparin  Injectable 5000 Unit(s) SubCutaneous every 8 hours  insulin lispro (HumaLOG) corrective regimen sliding scale   SubCutaneous three times a day before meals  lisinopril 5 milliGRAM(s) Oral daily  pantoprazole    Tablet 40 milliGRAM(s) Oral before breakfast                            10.1   3.27  )-----------( 292      ( 24 Jan 2020 06:13 )             34.5       Hemoglobin: 10.1 g/dL (01-24 @ 06:13)  Hemoglobin: 10.1 g/dL (01-24 @ 06:13)  Hemoglobin: 9.9 g/dL (01-23 @ 06:29)  Hemoglobin: 10.5 g/dL (01-22 @ 06:39)  Hemoglobin: 10.1 g/dL (01-21 @ 07:14)      01-24    136  |  103  |  11  ----------------------------<  134<H>  4.3   |  24  |  0.68    Ca    9.5      24 Jan 2020 06:13  Mg     2.1     01-24      Creatinine Trend: 0.68<--, 0.75<--, 0.75<--, 0.72<--, 0.70<--, 0.70<--    COAGS: PT/INR - ( 24 Jan 2020 06:13 )   PT: 11.1 SEC;   INR: 0.97          PTT - ( 24 Jan 2020 06:13 )  PTT:28.0 SEC      PHYSICAL EXAM:  Vital Signs last 24 Hours   T(C): 36.9 (01-24-20 @ 05:17), Max: 36.9 (01-23-20 @ 12:42)  HR: 65 (01-24-20 @ 05:17) (65 - 77)  BP: 106/60 (01-24-20 @ 05:17) (106/60 - 130/73)  RR: 16 (01-24-20 @ 05:17) (16 - 17)  SpO2: 99% (01-24-20 @ 05:17) (99% - 100%)  Wt(kg): --    I&O's Summary      Gen: Appears well in NAD  HEENT:  (-)icterus (-)pallor  CV: N S1 S2 1/6 CORTNEY (+)2 Pulses B/l  Resp:  Clear to auscultation B/L, normal effort  GI: (+) BS Soft, NT, ND  Lymph:  (-)Edema, (-)obvious lymphadenopathy  Skin: Warm to touch, Normal turgor  Psych: Appropriate mood and affect    TELEMETRY:  NSR     DIAGNOSTICS:    < from: CT Chest No Cont (01.22.20 @ 15:27) >  IMPRESSION:     No evidence of suggest pulmonary manifestations of sarcoidosis.     No evidence of thoracic, abdominal, or pelvic lymphadenopathy.    PACO CHANDLER M.D., RADIOLOGY RESIDENT  This document has been electronically signed.  KATEY WOOTEN M.D., ATTENDING RADIOLOGIST  This document has been electronically signed. 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. A cath was normal, and then she left A to pursue a further workup here in [place]. Echo here is unremarkable. Her baseline EKG has subtle late notching in the inferior leads - her signs and symptoms are highly concerning for an infiltrative cardiomyopathy (not a channelopathy) such as ARVD or cardiac sarcoid or amyloid. 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      -pt without cp, syncope, or anginal symptoms   -CT chest as noted above   -cont tele, NSR   --plan for dual chamber ICD (Medtronic) today   -pulm and rheum noted   -f/u with Dr. Arce 2/6/20 at 1:40 f/u hosp and Dr. Hines 1/31/20 at 1:30 pm after discharge  -f/u with her PMD Iggy Carbone at [facility] after discharge

## 2020-01-24 NOTE — PROGRESS NOTE ADULT - SUBJECTIVE AND OBJECTIVE BOX
Patient is a 54y old  Female who presents with a chief complaint of r/o sarcoidosis (2020 11:36)      SUBJECTIVE / OVERNIGHT EVENTS: overnight events noted    ROS:  Resp: No cough no sputum production  CVS: No chest pain no palpitations no orthopnea  GI: no N/V/D  : no dysuria, no hematuria  Neuro: no weakness no paresthesias  Heme: No petechiae no easy bruising  Msk: No joint pain no swelling  Skin: No rash no itching        MEDICATIONS  (STANDING):  artificial tears (preservative free) Ophthalmic Solution 1 Drop(s) Both EYES two times a day  dextrose 5%. 1000 milliLiter(s) (50 mL/Hr) IV Continuous <Continuous>  dextrose 50% Injectable 12.5 Gram(s) IV Push once  dextrose 50% Injectable 25 Gram(s) IV Push once  dextrose 50% Injectable 25 Gram(s) IV Push once  heparin  Injectable 5000 Unit(s) SubCutaneous every 8 hours  insulin lispro (HumaLOG) corrective regimen sliding scale   SubCutaneous three times a day before meals  lisinopril 5 milliGRAM(s) Oral daily  pantoprazole    Tablet 40 milliGRAM(s) Oral before breakfast    MEDICATIONS  (PRN):  dextrose 40% Gel 15 Gram(s) Oral once PRN Blood Glucose LESS THAN 70 milliGRAM(s)/deciliter  glucagon  Injectable 1 milliGRAM(s) IntraMuscular once PRN Glucose LESS THAN 70 milligrams/deciliter        CAPILLARY BLOOD GLUCOSE      POCT Blood Glucose.: 119 mg/dL (2020 08:38)  POCT Blood Glucose.: 173 mg/dL (2020 21:17)  POCT Blood Glucose.: 141 mg/dL (2020 17:23)  POCT Blood Glucose.: 110 mg/dL (2020 12:08)    I&O's Summary      Vital Signs Last 24 Hrs  T(C): 36.9 (2020 05:17), Max: 36.9 (2020 12:42)  T(F): 98.4 (2020 05:17), Max: 98.5 (2020 12:42)  HR: 65 (2020 05:17) (65 - 77)  BP: 106/60 (2020 05:17) (106/60 - 130/73)  BP(mean): --  RR: 16 (2020 05:17) (16 - 17)  SpO2: 99% (2020 05:17) (99% - 100%)    PHYSICAL EXAM:  GENERAL: in no apparent distress  HEAD:  Atraumatic, Normocephalic  EYES: EOMI, PERRLA, conjunctiva and sclera clear  NECK: Supple, No JVD  CHEST/LUNG: no wheeze, clear to auscultation bilaterally  HEART: S1 S2; No rubs or gallops, no murmurs appreciated  ABDOMEN: Soft, Nontender, Nondistended; Bowel sounds present  EXTREMITIES:  No clubbing or cyanosis, + Peripheral Pulses,  no edema  PSYCH: AO x 3 appropriate affect  NEUROLOGY: non-focal, motor and sensory systems intact  SKIN: No rashes or lesions    LABS:                        10.1   3.27  )-----------( 292      ( 2020 06:13 )             34.5     -24    136  |  103  |  11  ----------------------------<  134<H>  4.3   |  24  |  0.68    Ca    9.5      2020 06:13  Mg     2.1     -24      PT/INR - ( 2020 06:13 )   PT: 11.1 SEC;   INR: 0.97          PTT - ( 2020 06:13 )  PTT:28.0 SEC      Urinalysis Basic - ( 2020 08:17 )    Color: LIGHT YELLOW / Appearance: CLEAR / S.023 / pH: 6.5  Gluc: NEGATIVE / Ketone: NEGATIVE  / Bili: NEGATIVE / Urobili: NORMAL   Blood: NEGATIVE / Protein: NEGATIVE / Nitrite: NEGATIVE   Leuk Esterase: NEGATIVE / RBC: x / WBC x   Sq Epi: x / Non Sq Epi: x / Bacteria: x          All consultant(s) notes reviewed and care discussed with other providers        Contact Number, Dr Hsieh [phone]

## 2020-01-24 NOTE — PROGRESS NOTE ADULT - PROBLEM/PLAN-3
Cardiology Cardiology Cardiology Cardiology Cardiology Internal Medicine Internal Medicine Internal Medicine Internal Medicine Internal Medicine Nephrology Nephrology Nephrology Nephrology Nephrology DISPLAY PLAN FREE TEXT

## 2020-01-24 NOTE — PROGRESS NOTE ADULT - SUBJECTIVE AND OBJECTIVE BOX
Patient is a 54y old  Female who presents with a chief complaint of r/o sarcoidosis (2020 11:36)      Any change in ROS: Doingok : no SOB     MEDICATIONS  (STANDING):  artificial tears (preservative free) Ophthalmic Solution 1 Drop(s) Both EYES two times a day  dextrose 5%. 1000 milliLiter(s) (50 mL/Hr) IV Continuous <Continuous>  dextrose 50% Injectable 12.5 Gram(s) IV Push once  dextrose 50% Injectable 25 Gram(s) IV Push once  dextrose 50% Injectable 25 Gram(s) IV Push once  heparin  Injectable 5000 Unit(s) SubCutaneous every 8 hours  insulin lispro (HumaLOG) corrective regimen sliding scale   SubCutaneous three times a day before meals  lisinopril 5 milliGRAM(s) Oral daily  pantoprazole    Tablet 40 milliGRAM(s) Oral before breakfast    MEDICATIONS  (PRN):  dextrose 40% Gel 15 Gram(s) Oral once PRN Blood Glucose LESS THAN 70 milliGRAM(s)/deciliter  glucagon  Injectable 1 milliGRAM(s) IntraMuscular once PRN Glucose LESS THAN 70 milligrams/deciliter    Vital Signs Last 24 Hrs  T(C): 36.9 (2020 05:17), Max: 36.9 (2020 21:33)  T(F): 98.4 (2020 05:17), Max: 98.4 (2020 21:33)  HR: 65 (2020 05:17) (65 - 77)  BP: 106/60 (2020 05:17) (106/60 - 130/73)  BP(mean): --  RR: 16 (2020 05:17) (16 - 16)  SpO2: 99% (2020 05:17) (99% - 100%)    I&O's Summary        Physical Exam:   GENERAL: NAD, well-groomed, well-developed  HEENT: SILVIANO/   Atraumatic, Normocephalic  ENMT: No tonsillar erythema, exudates, or enlargement; Moist mucous membranes, Good dentition, No lesions  NECK: Supple, No JVD, Normal thyroid  CHEST/LUNG: Clear to auscultaion, ; No rales, rhonchi, wheezing, or rubs  CVS: Regular rate and rhythm; No murmurs, rubs, or gallops  GI: : Soft, Nontender, Nondistended; Bowel sounds present  NERVOUS SYSTEM:  Alert & Oriented X3  EXTREMITIES:  2+ Peripheral Pulses, No clubbing, cyanosis, or edema  LYMPH: No lymphadenopathy noted  SKIN: No rashes or lesions  ENDOCRINOLOGY: No Thyromegaly  PSYCH: Appropriate    Labs:                              10.1   3.27  )-----------( 292      ( 2020 06:13 )             34.5                         9.9    3.12  )-----------( 290      ( 2020 06:29 )             33.6                         10.5   3.23  )-----------( 334      ( 2020 06:39 )             36.3                         10.1   3.14  )-----------( 314      ( 2020 07:14 )             34.5     01-24    136  |  103  |  11  ----------------------------<  134<H>  4.3   |  24  |  0.68  -23    138  |  103  |  13  ----------------------------<  134<H>  4.4   |  23  |  0.75  -22    137  |  103  |  14  ----------------------------<  142<H>  4.5   |  24  |  0.75  -21    138  |  104  |  15  ----------------------------<  130<H>  4.4   |  25  |  0.72    Ca    9.5      2020 06:13  Ca    9.6      2020 06:29  Mg     2.1     -24  Mg     2.0     -23      CAPILLARY BLOOD GLUCOSE      POCT Blood Glucose.: 89 mg/dL (2020 12:02)  POCT Blood Glucose.: 119 mg/dL (2020 08:38)  POCT Blood Glucose.: 173 mg/dL (2020 21:17)  POCT Blood Glucose.: 141 mg/dL (2020 17:23)        PT/INR - ( 2020 06:13 )   PT: 11.1 SEC;   INR: 0.97          PTT - ( 2020 06:13 )  PTT:28.0 SEC  Urinalysis Basic - ( 2020 08:17 )    Color: LIGHT YELLOW / Appearance: CLEAR / S.023 / pH: 6.5  Gluc: NEGATIVE / Ketone: NEGATIVE  / Bili: NEGATIVE / Urobili: NORMAL   Blood: NEGATIVE / Protein: NEGATIVE / Nitrite: NEGATIVE   Leuk Esterase: NEGATIVE / RBC: x / WBC x   Sq Epi: x / Non Sq Epi: x / Bacteria: x    < from: CT Chest No Cont (20 @ 15:27) >  ABDOMEN AND PELVIS:    LIVER: Within normal limits.  BILE DUCTS: Normal caliber.  GALLBLADDER: Within normal limits.  SPLEEN: Within normal limits.  PANCREAS: Within normal limits.  ADRENALS: Within normal limits.  KIDNEYS/URETERS: Punctate left lower pole calculus. No hydronephrosis.    BLADDER: Within normal limits.  REPRODUCTIVE ORGANS: The endometrium measures up to 9 mm. No solid adnexal mass.    BOWEL: Small hiatal hernia. Sleeve gastrectomy. No bowel obstruction. Appendix within normal limits. Colonic diverticulosis.  PERITONEUM:No ascites.  VESSELS: Within normal limits.  RETROPERITONEUM/LYMPH NODES: No lymphadenopathy.    ABDOMINAL WALL: Umbilical hernia containing fat and bowel.  BONES: Chronic bilateral rib fractures. Degenerative changes of the spine.    IMPRESSION:     No evidence of suggest pulmonary manifestations of sarcoidosis.     No evidence of thoracic, abdominal, or pelvic lymphadenopathy.              < end of copied text >          RECENT CULTURES:        RESPIRATORY CULTURES:          Studies  Chest X-RAY  CT SCAN Chest   Venous Dopplers: LE:   CT Abdomen  Others

## 2020-01-24 NOTE — PROVIDER CONTACT NOTE (OTHER) - ACTION/TREATMENT ORDERED:
No action/treatment ordered
n/a
will continue to monitor blood sugar, vitals and cardiac monitor
Safety mantained,will continue to monitor.

## 2020-01-24 NOTE — PROGRESS NOTE ADULT - ATTENDING COMMENTS
Patient seen and examined, agree with above assessment and plan as transcribed above.    - for ICD today    Felix Grover MD, [facility]  BEEPER [phone]

## 2020-01-24 NOTE — PROGRESS NOTE ADULT - SUBJECTIVE AND OBJECTIVE BOX
Subjective: No chest pain or sob; ROS otherwise negative.         MEDICATIONS  (STANDING):  artificial tears (preservative free) Ophthalmic Solution 1 Drop(s) Both EYES two times a day  dextrose 5%. 1000 milliLiter(s) (50 mL/Hr) IV Continuous <Continuous>  dextrose 50% Injectable 12.5 Gram(s) IV Push once  dextrose 50% Injectable 25 Gram(s) IV Push once  dextrose 50% Injectable 25 Gram(s) IV Push once  heparin  Injectable 5000 Unit(s) SubCutaneous every 8 hours  insulin lispro (HumaLOG) corrective regimen sliding scale   SubCutaneous three times a day before meals  lisinopril 5 milliGRAM(s) Oral daily  pantoprazole    Tablet 40 milliGRAM(s) Oral before breakfast    MEDICATIONS  (PRN):  dextrose 40% Gel 15 Gram(s) Oral once PRN Blood Glucose LESS THAN 70 milliGRAM(s)/deciliter  glucagon  Injectable 1 milliGRAM(s) IntraMuscular once PRN Glucose LESS THAN 70 milligrams/deciliter      LABS:                            10.1   3.27  )-----------( 292      ( 24 Jan 2020 06:13 )             34.5     Hemoglobin: 10.1 g/dL (01-24 @ 06:13)  Hemoglobin: 10.1 g/dL (01-24 @ 06:13)  Hemoglobin: 9.9 g/dL (01-23 @ 06:29)  Hemoglobin: 10.5 g/dL (01-22 @ 06:39)  Hemoglobin: 10.1 g/dL (01-21 @ 07:14)    01-24    136  |  103  |  11  ----------------------------<  134<H>  4.3   |  24  |  0.68    Ca    9.5      24 Jan 2020 06:13  Mg     2.1     01-24      Creatinine Trend: 0.68<--, 0.75<--, 0.75<--, 0.72<--, 0.70<--, 0.70<--   PT/INR - ( 24 Jan 2020 06:13 )   PT: 11.1 SEC;   INR: 0.97          PTT - ( 24 Jan 2020 06:13 )  PTT:28.0 SEC        PHYSICAL EXAM  Vital Signs Last 24 Hrs  T(C): 36.9 (24 Jan 2020 05:17), Max: 36.9 (23 Jan 2020 12:42)  T(F): 98.4 (24 Jan 2020 05:17), Max: 98.5 (23 Jan 2020 12:42)  HR: 65 (24 Jan 2020 05:17) (65 - 77)  BP: 106/60 (24 Jan 2020 05:17) (106/60 - 130/73)  BP(mean): --  RR: 16 (24 Jan 2020 05:17) (16 - 17)  SpO2: 99% (24 Jan 2020 05:17) (99% - 100%)      Appearance: Normal	  HEENT:   Normal oral mucosa, PERRL, EOMI	  Lymphatic: No lymphadenopathy , no edema  Cardiovascular: Normal S1 S2, No JVD, No murmurs , Peripheral pulses palpable 2+ bilaterally  Respiratory: Lungs clear to auscultation, normal effort 	  Gastrointestinal:  Soft, Non-tender, + BS	  Skin: No rashes, No ecchymoses, No cyanosis, warm to touch  Musculoskeletal: Normal range of motion, normal strength  Psychiatry:  Mood & affect appropriate    TELEMETRY: SR	 1 episode blocked PAC overnight      DIAGNOSTIC  < from: Transthoracic Echocardiogram (01.16.20 @ 16:35) >  OBSERVATIONS:  Mitral Valve: Normal mitral valve. Minimal mitral  regurgitation.  Aortic Root: Normal aortic root.  Aortic Valve: Normal trileaflet aortic valve.  Left Atrium: Normal left atrium.  Left Ventricle: Endocardium not well visualized; grossly  normal left ventricular systolic function. Normal left  ventricular internal dimensions and wall thicknesses.  Right Heart: Normal right atrium. The right ventricle is  not well visualized; grossly normal right ventricular  systolic function. Normal tricuspid valve. Minimal  tricuspid regurgitation. Normal pulmonic valve.  Pericardium/PleuraNormal pericardium with no pericardial  effusion.  ------------------------------------------------------------------------  CONCLUSIONS:  1. Normal mitral valve. Minimal mitral regurgitation.  2. Normal left ventricular internal dimensions and wall  thicknesses.  3. Endocardium not well visualized; grossly normal left  ventricular systolic function.  4. The right ventricle is not well visualized; grossly  normal right ventricular systolic function.    < end of copied text >      ASSESSMENT/PLAN: 	54y Female with history of DM, history of cardiac arrest this month in [place] admitted with palpitations.     -Monitor on tele to rule out malignant arrhythmias  -No significant CAD seen at outside hospital cath per their records   -EP follow up appreciated - AICD for secondary prevention   -TTE with normal BI-V function no valve disease   -No evidence of sarcoid on CT   -Rheum consult appreciated - rheum workup in progress  -Optho eval appreciated - no acute intervention - outpt follow up   -No repeat ischemic eval needed at this time

## 2020-01-24 NOTE — PROGRESS NOTE ADULT - SUBJECTIVE AND OBJECTIVE BOX
EP Brief Operative Note    Diagnosis: VF  Procedure: dual chamber ICD implant  Surgeon: Rebekah Arce M.D.  Findings: none  EBL: minimal  Specimens: none  Post-op Diagnosis: VF  Assistants: none      A/P) successful medtronic dual chamber ICD, no acute complications    -cxr, ekg  -ancef 2g IV q8h x 2 more doses  -d/c home in AM  -f/u with [facility] Friday Jan 31 at 1:30pm  -f/u with me Thursday Feb 6th at 1:40pm        Rebekah Arce M.D., [facility]  Cardiac Electrophysiology  [facility]  [address] [place] [address], [place] [address]  [email or web address]    office [phone]  pager [phone]

## 2020-01-24 NOTE — PROGRESS NOTE ADULT - ATTENDING COMMENTS
Agree with above  AICD per EP  No further interventional workup needed at this time    Allison Dennis MD

## 2020-01-24 NOTE — PROGRESS NOTE ADULT - SUBJECTIVE AND OBJECTIVE BOX
EP ATTENDING    tele: NSR, no events    she denies palpitations, syncope, nor angina, ROS otherwise -    artificial tears (preservative free) Ophthalmic Solution 1 Drop(s) Both EYES two times a day  dextrose 40% Gel 15 Gram(s) Oral once PRN  dextrose 5%. 1000 milliLiter(s) IV Continuous <Continuous>  dextrose 50% Injectable 12.5 Gram(s) IV Push once  dextrose 50% Injectable 25 Gram(s) IV Push once  dextrose 50% Injectable 25 Gram(s) IV Push once  glucagon  Injectable 1 milliGRAM(s) IntraMuscular once PRN  heparin  Injectable 5000 Unit(s) SubCutaneous every 8 hours  insulin lispro (HumaLOG) corrective regimen sliding scale   SubCutaneous three times a day before meals  lisinopril 5 milliGRAM(s) Oral daily  pantoprazole    Tablet 40 milliGRAM(s) Oral before breakfast                            10.1   3.27  )-----------( 292      ( 24 Jan 2020 06:13 )             34.5       01-24    136  |  103  |  11  ----------------------------<  134<H>  4.3   |  24  |  0.68    Ca    9.5      24 Jan 2020 06:13  Mg     2.1     01-24      T(C): 36.9 (01-24-20 @ 05:17), Max: 36.9 (01-23-20 @ 21:33)  HR: 65 (01-24-20 @ 05:17) (65 - 77)  BP: 106/60 (01-24-20 @ 05:17) (106/60 - 130/73)  RR: 16 (01-24-20 @ 05:17) (16 - 16)  SpO2: 99% (01-24-20 @ 05:17) (99% - 100%)  Wt(kg): --    A&O x 3  neurologically intact  no JVD  RRR, no murmurs  CTAB  soft nt/nd  no c/c/e    cath at [facility]: unremarkable  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 A cath was normal, and then she left AMA to pursue a further workup here in [place]. Echo here is unremarkable. Her baseline EKG has subtle late notching in the inferior leads - her signs and symptoms are highly concerning for an infiltrative cardiomyopathy (not a channelopathy) such as ARVD or cardiac sarcoid or amyloid. 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 Her underlying diagnosis that caused the VF arrest still remains unclear. Remaining outpatient tests include PET CT and cardiac CT    -regardless of the above workup I recommended a dual chamber ICD for the secondary prevention of sudden cardiac death. I cited a 3% risk of bleeding or infection, and a 1% risk of major complication including but not limited to heart attack, stroke, death or need for emergency open heart surgery or pericardiocentesis. Understanding their R/B/A she and her  elect dual chamber ICD implant  -keep NPO for dual chamber ICD today  -I will wait until AF is documented on her atrial lead before recommending a NOAC  -f/u with her PMD Iggy Carbone at [facility] after discharge  -expect d/c home Saturday morning  -f/u with [facility] Friday Jan 31 at 1:30pm  -f/u with me Thursday Feb 6th at 1:40pm

## 2020-01-24 NOTE — PROGRESS NOTE ADULT - PROBLEM SELECTOR PLAN 1
no evidence of sarcoidosis on ct chest or abdomen: get opthalmology evalution  1/24: vasculitis workup pending and so is the ACE

## 2020-01-24 NOTE — PROGRESS NOTE ADULT - PROBLEM SELECTOR PLAN 3
AICD today  echocardiogram normal  MRI cardiac noted   defer to EP and cardiology attending  likely AICD tomorrow

## 2020-01-25 ENCOUNTER — TRANSCRIPTION ENCOUNTER (OUTPATIENT)
Age: 55
End: 2020-01-25

## 2020-01-25 VITALS
SYSTOLIC BLOOD PRESSURE: 103 MMHG | TEMPERATURE: 98 F | HEART RATE: 68 BPM | OXYGEN SATURATION: 100 % | DIASTOLIC BLOOD PRESSURE: 66 MMHG | RESPIRATION RATE: 16 BRPM

## 2020-01-25 LAB
ANION GAP SERPL CALC-SCNC: 12 MMO/L — SIGNIFICANT CHANGE UP (ref 7–14)
BUN SERPL-MCNC: 12 MG/DL — SIGNIFICANT CHANGE UP (ref 7–23)
CALCIUM SERPL-MCNC: 9.6 MG/DL — SIGNIFICANT CHANGE UP (ref 8.4–10.5)
CHLORIDE SERPL-SCNC: 103 MMOL/L — SIGNIFICANT CHANGE UP (ref 98–107)
CO2 SERPL-SCNC: 22 MMOL/L — SIGNIFICANT CHANGE UP (ref 22–31)
CREAT SERPL-MCNC: 0.68 MG/DL — SIGNIFICANT CHANGE UP (ref 0.5–1.3)
GAMMA INTERFERON BACKGROUND BLD IA-ACNC: 0.01 IU/ML — SIGNIFICANT CHANGE UP
GLUCOSE BLDC GLUCOMTR-MCNC: 106 MG/DL — HIGH (ref 70–99)
GLUCOSE BLDC GLUCOMTR-MCNC: 136 MG/DL — HIGH (ref 70–99)
GLUCOSE SERPL-MCNC: 100 MG/DL — HIGH (ref 70–99)
HCT VFR BLD CALC: 32.9 % — LOW (ref 34.5–45)
HGB BLD-MCNC: 10 G/DL — LOW (ref 11.5–15.5)
M TB IFN-G BLD-IMP: NEGATIVE — SIGNIFICANT CHANGE UP
M TB IFN-G CD4+ BCKGRND COR BLD-ACNC: 0 IU/ML — SIGNIFICANT CHANGE UP
M TB IFN-G CD4+CD8+ BCKGRND COR BLD-ACNC: 0 IU/ML — SIGNIFICANT CHANGE UP
MAGNESIUM SERPL-MCNC: 1.8 MG/DL — SIGNIFICANT CHANGE UP (ref 1.6–2.6)
MCHC RBC-ENTMCNC: 21.9 PG — LOW (ref 27–34)
MCHC RBC-ENTMCNC: 30.4 % — LOW (ref 32–36)
MCV RBC AUTO: 72.1 FL — LOW (ref 80–100)
NRBC # FLD: 0 K/UL — SIGNIFICANT CHANGE UP (ref 0–0)
PHOSPHATE SERPL-MCNC: 4 MG/DL — SIGNIFICANT CHANGE UP (ref 2.5–4.5)
PLATELET # BLD AUTO: 282 K/UL — SIGNIFICANT CHANGE UP (ref 150–400)
PMV BLD: 10.7 FL — SIGNIFICANT CHANGE UP (ref 7–13)
POTASSIUM SERPL-MCNC: 4.1 MMOL/L — SIGNIFICANT CHANGE UP (ref 3.5–5.3)
POTASSIUM SERPL-SCNC: 4.1 MMOL/L — SIGNIFICANT CHANGE UP (ref 3.5–5.3)
QUANT TB PLUS MITOGEN MINUS NIL: 8.85 IU/ML — SIGNIFICANT CHANGE UP
RBC # BLD: 4.56 M/UL — SIGNIFICANT CHANGE UP (ref 3.8–5.2)
RBC # FLD: 17.3 % — HIGH (ref 10.3–14.5)
SODIUM SERPL-SCNC: 137 MMOL/L — SIGNIFICANT CHANGE UP (ref 135–145)
WBC # BLD: 5.12 K/UL — SIGNIFICANT CHANGE UP (ref 3.8–10.5)
WBC # FLD AUTO: 5.12 K/UL — SIGNIFICANT CHANGE UP (ref 3.8–10.5)

## 2020-01-25 RX ORDER — METOPROLOL TARTRATE 50 MG
1 TABLET ORAL
Qty: 30 | Refills: 0
Start: 2020-01-25 | End: 2020-02-23

## 2020-01-25 RX ORDER — METOPROLOL TARTRATE 50 MG
25 TABLET ORAL DAILY
Refills: 0 | Status: DISCONTINUED | OUTPATIENT
Start: 2020-01-25 | End: 2020-01-25

## 2020-01-25 RX ADMIN — LISINOPRIL 5 MILLIGRAM(S): 2.5 TABLET ORAL at 06:22

## 2020-01-25 RX ADMIN — Medication 25 MILLIGRAM(S): at 06:22

## 2020-01-25 RX ADMIN — Medication 100 MILLIGRAM(S): at 06:22

## 2020-01-25 NOTE — PROGRESS NOTE ADULT - SUBJECTIVE AND OBJECTIVE BOX
Patient is a 54y old  Female who presents with a chief complaint of cardaic arrest (25 Jan 2020 13:27)      SUBJECTIVE / OVERNIGHT EVENTS: overnight events noted    ROS:  Resp: No cough no sputum production  CVS: No chest pain no palpitations no orthopnea  GI: no N/V/D  : no dysuria, no hematuria  Neuro: no weakness no paresthesias  Heme: No petechiae no easy bruising  Msk: No joint pain no swelling  Skin: No rash no itching        MEDICATIONS  (STANDING):  dextrose 5%. 1000 milliLiter(s) (50 mL/Hr) IV Continuous <Continuous>  dextrose 50% Injectable 12.5 Gram(s) IV Push once  dextrose 50% Injectable 25 Gram(s) IV Push once  dextrose 50% Injectable 25 Gram(s) IV Push once  insulin lispro (HumaLOG) corrective regimen sliding scale   SubCutaneous three times a day before meals  lisinopril 5 milliGRAM(s) Oral daily  metoprolol succinate ER 25 milliGRAM(s) Oral daily    MEDICATIONS  (PRN):  dextrose 40% Gel 15 Gram(s) Oral once PRN Blood Glucose LESS THAN 70 milliGRAM(s)/deciliter  glucagon  Injectable 1 milliGRAM(s) IntraMuscular once PRN Glucose LESS THAN 70 milligrams/deciliter        CAPILLARY BLOOD GLUCOSE      POCT Blood Glucose.: 136 mg/dL (25 Jan 2020 12:19)  POCT Blood Glucose.: 106 mg/dL (25 Jan 2020 08:17)  POCT Blood Glucose.: 225 mg/dL (24 Jan 2020 21:38)  POCT Blood Glucose.: 109 mg/dL (24 Jan 2020 18:37)    I&O's Summary      Vital Signs Last 24 Hrs  T(C): 36.7 (25 Jan 2020 11:46), Max: 37.1 (24 Jan 2020 18:40)  T(F): 98.1 (25 Jan 2020 11:46), Max: 98.7 (24 Jan 2020 18:40)  HR: 68 (25 Jan 2020 11:46) (68 - 98)  BP: 103/66 (25 Jan 2020 11:46) (103/66 - 122/70)  BP(mean): --  RR: 16 (25 Jan 2020 11:46) (16 - 18)  SpO2: 100% (25 Jan 2020 11:46) (100% - 100%)    PHYSICAL EXAM:  GENERAL: in no apparent distress  HEAD:  Atraumatic, Normocephalic  EYES: EOMI, PERRLA, conjunctiva and sclera clear  NECK: Supple, No JVD  CHEST/LUNG: no wheeze, clear to auscultation bilaterally  HEART: S1 S2; No rubs or gallops, no murmurs appreciated  ABDOMEN: Soft, Nontender, Nondistended; Bowel sounds present  EXTREMITIES:  No clubbing or cyanosis, + Peripheral Pulses,  no edema  PSYCH: AO x 3 appropriate affect  NEUROLOGY: non-focal, motor and sensory systems intact  SKIN: No rashes or lesions    LABS:                        10.0   5.12  )-----------( 282      ( 25 Jan 2020 05:47 )             32.9     01-25    137  |  103  |  12  ----------------------------<  100<H>  4.1   |  22  |  0.68    Ca    9.6      25 Jan 2020 05:47  Phos  4.0     01-25  Mg     1.8     01-25      PT/INR - ( 24 Jan 2020 06:13 )   PT: 11.1 SEC;   INR: 0.97          PTT - ( 24 Jan 2020 06:13 )  PTT:28.0 SEC            All consultant(s) notes reviewed and care discussed with other providers        Contact Number, Dr Hsieh [phone]

## 2020-01-25 NOTE — DISCHARGE NOTE NURSING/CASE MANAGEMENT/SOCIAL WORK - PATIENT PORTAL LINK FT
You can access the FollowMyHealth Patient Portal offered by [facility] by registering at the following website: [email or web address]. By joining [employer]’s FollowMyHealth portal, you will also be able to view your health information using other applications (apps) compatible with our system.

## 2020-01-25 NOTE — DISCHARGE NOTE PROVIDER - CARE PROVIDERS DIRECT ADDRESSES
,DirectAddress_Unknown,DirectAddress_Unknown ,DirectAddress_Unknown,DirectAddress_Unknown,[email or web address]

## 2020-01-25 NOTE — PROGRESS NOTE ADULT - PROBLEM SELECTOR PLAN 1
no evidence of sarcoidosis on ct chest or abdomen: get opthalmology evalution  1/24: vasculitis workup pending and so is the ACE  1/25: workup still pending: doing great
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
## 2020-01-25 NOTE — DISCHARGE NOTE PROVIDER - NSDCMRMEDTOKEN_GEN_ALL_CORE_FT
Glucophage 1000 mg oral tablet: 1 tab(s) orally 2 times a day  lisinopril 5 mg oral tablet: 1 tab(s) orally once a day  Onglyza 5 mg oral tablet: 1 tab(s) orally once a day Glucophage 1000 mg oral tablet: 1 tab(s) orally 2 times a day  lisinopril 5 mg oral tablet: 1 tab(s) orally once a day  metoprolol succinate 25 mg oral tablet, extended release: 1 tab(s) orally once a day  Onglyza 5 mg oral tablet: 1 tab(s) orally once a day

## 2020-01-25 NOTE — PROGRESS NOTE ADULT - SUBJECTIVE AND OBJECTIVE BOX
S:  no syncope, dizziness, lightheadedness, no cp or anginal symptoms. ROS otherwise -        dextrose 40% Gel 15 Gram(s) Oral once PRN  dextrose 5%. 1000 milliLiter(s) IV Continuous <Continuous>  dextrose 50% Injectable 12.5 Gram(s) IV Push once  dextrose 50% Injectable 25 Gram(s) IV Push once  dextrose 50% Injectable 25 Gram(s) IV Push once  glucagon  Injectable 1 milliGRAM(s) IntraMuscular once PRN  insulin lispro (HumaLOG) corrective regimen sliding scale   SubCutaneous three times a day before meals  lisinopril 5 milliGRAM(s) Oral daily  metoprolol succinate ER 25 milliGRAM(s) Oral daily                            10.0   5.12  )-----------( 282      ( 25 Jan 2020 05:47 )             32.9       Hemoglobin: 10.0 g/dL (01-25 @ 05:47)  Hemoglobin: 10.1 g/dL (01-24 @ 06:13)  Hemoglobin: 10.1 g/dL (01-24 @ 06:13)  Hemoglobin: 9.9 g/dL (01-23 @ 06:29)  Hemoglobin: 10.5 g/dL (01-22 @ 06:39)      01-25    137  |  103  |  12  ----------------------------<  100<H>  4.1   |  22  |  0.68    Ca    9.6      25 Jan 2020 05:47  Phos  4.0     01-25  Mg     1.8     01-25      Creatinine Trend: 0.68<--, 0.68<--, 0.75<--, 0.75<--, 0.72<--, 0.70<--    COAGS:       PHYSICAL EXAM:  Vital Signs last 24 Hours   T(C): 36.7 (01-25-20 @ 11:46), Max: 37.1 (01-24-20 @ 18:40)  HR: 68 (01-25-20 @ 11:46) (68 - 98)  BP: 103/66 (01-25-20 @ 11:46) (103/66 - 122/70)  RR: 16 (01-25-20 @ 11:46) (16 - 18)  SpO2: 100% (01-25-20 @ 11:46) (100% - 100%)  Wt(kg): --    I&O's Summary      Gen: Appears well in NAD  HEENT:  (-)icterus (-)pallor  CV: N S1 S2 1/6 CORTNEY (+)2 Pulses B/l  Resp:  Clear to auscultation B/L, normal effort  GI: (+) BS Soft, NT, ND  Lymph:  (-)Edema, (-)obvious lymphadenopathy  Skin: Warm to touch, Normal turgor  Psych: Appropriate mood and affect    TELEMETRY:  NSR     DIAGNOSTICS:    < from: CT Chest No Cont (01.22.20 @ 15:27) >  IMPRESSION:     No evidence of suggest pulmonary manifestations of sarcoidosis.     No evidence of thoracic, abdominal, or pelvic lymphadenopathy.    PACO CHANDLER M.D., RADIOLOGY RESIDENT  This document has been electronically signed.  KATEY WOOTEN M.D., ATTENDING RADIOLOGIST  This document has been electronically signed. Jan 22 2020  4:07PM    < end of copied text >      cath at [place]: unremarkable  cardiac MRI most consistent with cardiac sarcoid  echo: normal LVEFF  PET/CT: will do as outpatient  Cardiac CT w/wo contrast: will do as outpatient      ASSESSMENT:      55 y/o female PMH DM and reported PAF who had a documented VF arrest while in [facility]. A cath was normal, and then she left [place] to pursue a further workup here in [place]. Echo here is unremarkable. Her baseline EKG has subtle late notching in the inferior leads - her signs and symptoms are highly concerning for an infiltrative cardiomyopathy (not a channelopathy) such as ARVD or cardiac sarcoid or amyloid. 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      -pt without cp, syncope, or anginal symptoms   -CT chest as noted above   -cont tele, NSR   --s/p dual chamber ICD ([employer]), tolerated procedure well from cv perspective  --pulm and rheum noted   --f/u rheume outpt, needs outpt f/u 2-3 weeks   --f/u opthalmology in 1 week; her ophthalmologist or in the [facility] within 1 week of discharge  --f/u with Dr. Arce 2/6/20 at 1:40 f/u hosp and Dr. Hines 1/31/20 at 1:30 pm after discharge  --f/u with her PMD Iggy Carbone at [facility] after discharge Statement Selected

## 2020-01-25 NOTE — PROGRESS NOTE ADULT - REASON FOR ADMISSION
palpitations
cardiac work up
dizziness and palpitations
r/o sarcoidosis
cardaic arrest
r/o sarcoidosis

## 2020-01-25 NOTE — DISCHARGE NOTE PROVIDER - NSFOLLOWUPCLINICS_GEN_ALL_ED_FT
[facility]hthal[facility]  Ophthalmology  [address] [place] [address] [address]  [place] [address]  Phone: [phone]  Fax:   Follow Up Time:

## 2020-01-25 NOTE — DISCHARGE NOTE PROVIDER - CARE PROVIDER_API CALL
Rebekah Arce)  Cardiac Electrophysiology; Cardiovascular Disease; Nuclear Cardiology  [address] [facility], [address]  [place] [address]  Phone: [phone]  Fax: [phone]  Follow Up Time:     Brianna Caban)  Cardiovascular Disease; Internal Medicine  [address] [facility], [address]  [place] [address]  Phone: [phone]  Fax: [phone]  Follow Up Time: Rebekah Arce)  Cardiac Electrophysiology; Cardiovascular Disease; Nuclear Cardiology  [address] [facility], [address]  [place] [address]  Phone: [phone]  Fax: [phone]  Follow Up Time:     Brianna Caban)  Cardiovascular Disease; Internal Medicine  [address] [facility], [address]  [place] [address]  Phone: [phone]  Fax: [phone]  Follow Up Time:     Janeen Dolwing)  Internal Medicine  [address] [facility] [address] [facility], [address]  [place] [address]  Phone: [phone]  Fax: [phone]  Follow Up Time:     Iggy Carbone)  Medicine  [address] [place] [address]  Phone: [phone]  Fax: [phone]  Follow Up Time:

## 2020-01-25 NOTE — DISCHARGE NOTE PROVIDER - NSDCCPCAREPLAN_GEN_ALL_CORE_FT
PRINCIPAL DISCHARGE DIAGNOSIS  Diagnosis: Nonobstructive cardiomyopathy  Assessment and Plan of Treatment: Follow up with your cardiologist Dr. Hines on Friday Jan 31 @ 1:30pm. Follow up with Dr. Arce on Thursday February 6th @ 1:40pm. Please call the office with any questions. Ensure compliance with your medications as directed.      SECONDARY DISCHARGE DIAGNOSES  Diagnosis: HTN (hypertension)  Assessment and Plan of Treatment: Follow up with your primary care physician for further monitoring in 1-2 weeks. Please call to arrange appointment. Low cholesterol diet. Salt restriction.    Diagnosis: Diabetes mellitus, type 2  Assessment and Plan of Treatment: Follow up with your primary care physician for further monitoring in 1-2 weeks. Please call to arrange appointment. Continue diet modification. Avoid complex carbohydrates such as bread, pasta, cereal, white rice, white potatoes, etc. Avoid concentrated sugar as found in desserts, candy, soda, juice, etc. Consume a diet based on lean protein (chicken, fish) and vegetables. PRINCIPAL DISCHARGE DIAGNOSIS  Diagnosis: Nonobstructive cardiomyopathy  Assessment and Plan of Treatment: Follow up with your cardiologist Dr. Hines on Friday Jan 31 @ 1:30pm. Follow up with Dr. Arce on Thursday February 6th @ 1:40pm. Please call the office with any questions. Ensure compliance with your medications as directed.  Follow up with Rheumatologist Dr. Dowling 2-3 weeks after discharge to complete work up of infiltrative cardiomyopathy. Please call the office to make an appointment.      SECONDARY DISCHARGE DIAGNOSES  Diagnosis: Blurred vision  Assessment and Plan of Treatment: Follow up with your ophthalmologist or in the [facility] Ophthalmology Practice within 1 week of discharge to complete work up. [address] [place] [address] 1102. [phone].    Diagnosis: HTN (hypertension)  Assessment and Plan of Treatment: Follow up with your primary care physician for further monitoring in 1-2 weeks. Please call to arrange appointment. Low cholesterol diet. Salt restriction.    Diagnosis: Diabetes mellitus, type 2  Assessment and Plan of Treatment: Follow up with your primary care physician for further monitoring in 1-2 weeks. Please call to arrange appointment. Continue diet modification. Avoid complex carbohydrates such as bread, pasta, cereal, white rice, white potatoes, etc. Avoid concentrated sugar as found in desserts, candy, soda, juice, etc. Consume a diet based on lean protein (chicken, fish) and vegetables.

## 2020-01-25 NOTE — PROGRESS NOTE ADULT - SUBJECTIVE AND OBJECTIVE BOX
Patient is a 54y old  Female who presents with a chief complaint of Cardiac arrest/Palps (25 Jan 2020 08:49)      Any change in ROS: nosymtpoms     MEDICATIONS  (STANDING):  dextrose 5%. 1000 milliLiter(s) (50 mL/Hr) IV Continuous <Continuous>  dextrose 50% Injectable 12.5 Gram(s) IV Push once  dextrose 50% Injectable 25 Gram(s) IV Push once  dextrose 50% Injectable 25 Gram(s) IV Push once  insulin lispro (HumaLOG) corrective regimen sliding scale   SubCutaneous three times a day before meals  lisinopril 5 milliGRAM(s) Oral daily  metoprolol succinate ER 25 milliGRAM(s) Oral daily    MEDICATIONS  (PRN):  dextrose 40% Gel 15 Gram(s) Oral once PRN Blood Glucose LESS THAN 70 milliGRAM(s)/deciliter  glucagon  Injectable 1 milliGRAM(s) IntraMuscular once PRN Glucose LESS THAN 70 milligrams/deciliter    Vital Signs Last 24 Hrs  T(C): 36.7 (25 Jan 2020 11:46), Max: 37.1 (24 Jan 2020 18:40)  T(F): 98.1 (25 Jan 2020 11:46), Max: 98.7 (24 Jan 2020 18:40)  HR: 68 (25 Jan 2020 11:46) (68 - 98)  BP: 103/66 (25 Jan 2020 11:46) (103/66 - 122/70)  BP(mean): --  RR: 16 (25 Jan 2020 11:46) (16 - 18)  SpO2: 100% (25 Jan 2020 11:46) (100% - 100%)    I&O's Summary        Physical Exam:   GENERAL: NAD, well-groomed, well-developed  HEENT: SILVIANO/   Atraumatic, Normocephalic  ENMT: No tonsillar erythema, exudates, or enlargement; Moist mucous membranes, Good dentition, No lesions  NECK: Supple, No JVD, Normal thyroid  CHEST/LUNG: Clear to auscultaion, ; No rales, rhonchi, wheezing, or rubs  CVS: Regular rate and rhythm; No murmurs, rubs, or gallops  GI: : Soft, Nontender, Nondistended; Bowel sounds present  NERVOUS SYSTEM:  Alert & Oriented X3  EXTREMITIES:  2+ Peripheral Pulses, No clubbing, cyanosis, or edema  LYMPH: No lymphadenopathy noted  SKIN: No rashes or lesions  ENDOCRINOLOGY: No Thyromegaly  PSYCH: Appropriate    Labs:                              10.0   5.12  )-----------( 282      ( 25 Jan 2020 05:47 )             32.9                         10.1   3.27  )-----------( 292      ( 24 Jan 2020 06:13 )             34.5                         9.9    3.12  )-----------( 290      ( 23 Jan 2020 06:29 )             33.6                         10.5   3.23  )-----------( 334      ( 22 Jan 2020 06:39 )             36.3     01-25    137  |  103  |  12  ----------------------------<  100<H>  4.1   |  22  |  0.68  01-24    136  |  103  |  11  ----------------------------<  134<H>  4.3   |  24  |  0.68  01-23    138  |  103  |  13  ----------------------------<  134<H>  4.4   |  23  |  0.75  01-22    137  |  103  |  14  ----------------------------<  142<H>  4.5   |  24  |  0.75    Ca    9.6      25 Jan 2020 05:47  Ca    9.5      24 Jan 2020 06:13  Phos  4.0     01-25  Mg     1.8     01-25  Mg     2.1     01-24      CAPILLARY BLOOD GLUCOSE      POCT Blood Glucose.: 136 mg/dL (25 Jan 2020 12:19)  POCT Blood Glucose.: 106 mg/dL (25 Jan 2020 08:17)  POCT Blood Glucose.: 225 mg/dL (24 Jan 2020 21:38)  POCT Blood Glucose.: 109 mg/dL (24 Jan 2020 18:37)        PT/INR - ( 24 Jan 2020 06:13 )   PT: 11.1 SEC;   INR: 0.97          PTT - ( 24 Jan 2020 06:13 )  PTT:28.0 SEC          RECENT CULTURES:        RESPIRATORY CULTURES:      < from: CT Chest No Cont (01.22.20 @ 15:27) >  BILE DUCTS: Normal caliber.  GALLBLADDER: Within normal limits.  SPLEEN: Within normal limits.  PANCREAS: Within normal limits.  ADRENALS: Within normal limits.  KIDNEYS/URETERS: Punctate left lower pole calculus. No hydronephrosis.    BLADDER: Within normal limits.  REPRODUCTIVE ORGANS: The endometrium measures up to 9 mm. No solid adnexal mass.    BOWEL: Small hiatal hernia. Sleeve gastrectomy. No bowel obstruction. Appendix within normal limits. Colonic diverticulosis.  PERITONEUM:No ascites.  VESSELS: Within normal limits.  RETROPERITONEUM/LYMPH NODES: No lymphadenopathy.    ABDOMINAL WALL: Umbilical hernia containing fat and bowel.  BONES: Chronic bilateral rib fractures. Degenerative changes of the spine.    IMPRESSION:     No evidence of suggest pulmonary manifestations of sarcoidosis.     No evidence of thoracic, abdominal, or pelvic lymphadenopathy.      < end of copied text >      Studies  Chest X-RAY  CT SCAN Chest   Venous Dopplers: LE:   CT Abdomen  Others

## 2020-01-25 NOTE — PROGRESS NOTE ADULT - ASSESSMENT
53 yo F p/w dizziness & palp's with HR to 185 transiently    EKG- NSR @ 81 Flat T waves v4-6, I, L Deferred to Pediatrician's Office

## 2020-01-25 NOTE — DISCHARGE NOTE PROVIDER - NSDCFUADDAPPT_GEN_ALL_CORE_FT
Follow up opthalmology in 1 week; her ophthalmologist or in the [facility] Op[facility] Practice within 1 week of discharge    Dr. Arce 2/6/20 at 1:40  Dr. Hines 1/31/20 at 1:30pm  PMD Iggy Carbone at [facility] after discharge

## 2020-01-25 NOTE — PROGRESS NOTE ADULT - SUBJECTIVE AND OBJECTIVE BOX
Subjective: No chest pain or sob; ROS otherwise negative.         dextrose 40% Gel 15 Gram(s) Oral once PRN  dextrose 5%. 1000 milliLiter(s) IV Continuous <Continuous>  dextrose 50% Injectable 12.5 Gram(s) IV Push once  dextrose 50% Injectable 25 Gram(s) IV Push once  dextrose 50% Injectable 25 Gram(s) IV Push once  glucagon  Injectable 1 milliGRAM(s) IntraMuscular once PRN  insulin lispro (HumaLOG) corrective regimen sliding scale   SubCutaneous three times a day before meals  lisinopril 5 milliGRAM(s) Oral daily  metoprolol succinate ER 25 milliGRAM(s) Oral daily                            10.0   5.12  )-----------( 282      ( 25 Jan 2020 05:47 )             32.9       01-25    137  |  103  |  12  ----------------------------<  100<H>  4.1   |  22  |  0.68    Ca    9.6      25 Jan 2020 05:47  Phos  4.0     01-25  Mg     1.8     01-25              T(C): 36.7 (01-25-20 @ 11:46), Max: 37.1 (01-24-20 @ 18:40)  HR: 68 (01-25-20 @ 11:46) (68 - 98)  BP: 103/66 (01-25-20 @ 11:46) (103/66 - 122/70)  RR: 16 (01-25-20 @ 11:46) (16 - 18)  SpO2: 100% (01-25-20 @ 11:46) (100% - 100%)  Wt(kg): --    I&O's Summary      Appearance: Normal	  HEENT:   Normal oral mucosa, PERRL, EOMI	  Lymphatic: No lymphadenopathy , no edema  Cardiovascular: Normal S1 S2, No JVD, No murmurs , Peripheral pulses palpable 2+ bilaterally  Respiratory: Lungs clear to auscultation, normal effort 	  Gastrointestinal:  Soft, Non-tender, + BS	  Skin: No rashes, No ecchymoses, No cyanosis, warm to touch  Musculoskeletal: Normal range of motion, normal strength  Psychiatry:  Mood & affect appropriate  AICD site: c/d/i    TELEMETRY: SR	      DIAGNOSTIC  < from: Transthoracic Echocardiogram (01.16.20 @ 16:35) >  OBSERVATIONS:  Mitral Valve: Normal mitral valve. Minimal mitral  regurgitation.  Aortic Root: Normal aortic root.  Aortic Valve: Normal trileaflet aortic valve.  Left Atrium: Normal left atrium.  Left Ventricle: Endocardium not well visualized; grossly  normal left ventricular systolic function. Normal left  ventricular internal dimensions and wall thicknesses.  Right Heart: Normal right atrium. The right ventricle is  not well visualized; grossly normal right ventricular  systolic function. Normal tricuspid valve. Minimal  tricuspid regurgitation. Normal pulmonic valve.  Pericardium/PleuraNormal pericardium with no pericardial  effusion.  ------------------------------------------------------------------------  CONCLUSIONS:  1. Normal mitral valve. Minimal mitral regurgitation.  2. Normal left ventricular internal dimensions and wall  thicknesses.  3. Endocardium not well visualized; grossly normal left  ventricular systolic function.  4. The right ventricle is not well visualized; grossly  normal right ventricular systolic function.    < end of copied text >      ASSESSMENT/PLAN: 	54y Female with history of DM, history of cardiac arrest this month in [place] admitted with palpitations.     -Recent cath with no significant CAD  -Pt. s/p AICD implantation  -No further interventional workup needed at this time  -Follow up rheum after dc  -DC home with outpatient follow up with Dr. Hines and Dr. Yazmin Dennis MD

## 2020-01-25 NOTE — DISCHARGE NOTE PROVIDER - PROVIDER TOKENS
PROVIDER:[TOKEN:[[id number]]],PROVIDER:[TOKEN:[[id number]]] PROVIDER:[TOKEN:[[id number]]],PROVIDER:[TOKEN:[[id number]]],PROVIDER:[TOKEN:[[id number]]],PROVIDER:[TOKEN:[[id number]]]

## 2020-01-25 NOTE — DISCHARGE NOTE NURSING/CASE MANAGEMENT/SOCIAL WORK - NSDCFUADDAPPT_GEN_ALL_CORE_FT
Follow up opthalmology in 1 week; her ophthalmologist or in the [facility] Ophthalmology Practice within 1 week of discharge    Dr. Arce 2/6/20 at 1:40  Dr. Hines 1/31/20 at 1:30pm  PMD Iggy Carbone at [facility] after discharge

## 2020-01-27 LAB
DSDNA AB SER-ACNC: <12 IU/ML — SIGNIFICANT CHANGE UP
MYELOPEROXIDASE AB SER-ACNC: <5 UNITS — SIGNIFICANT CHANGE UP
PROTEINASE3 AB FLD-ACNC: 8.1 UNITS — SIGNIFICANT CHANGE UP

## 2020-01-29 LAB
C-ANCA SER-ACNC: NEGATIVE — SIGNIFICANT CHANGE UP
P-ANCA SER-ACNC: NEGATIVE — SIGNIFICANT CHANGE UP

## 2021-10-20 ENCOUNTER — RESULT REVIEW (OUTPATIENT)
Age: 56
End: 2021-10-20

## 2024-02-06 NOTE — ED PROVIDER NOTE - SKIN, MLM
You has been diagnosed with strep throat.    There is a nationwide shortage of the antibiotic shot, therefore oral antibiotics were written for you.    She also has influenza A, which is caused by a virus and does not require antibiotic therapy.  Over-the-counter symptomatic relief is recommended.  Please allow 48 hours to notice symptomatic improvement.  Rotate Tylenol and/or ibuprofen as needed for fever/pain.  Salt water gargles may also help your symptoms.  Follow-up with PCP in 1 week.    
Skin normal color for race, warm, dry and intact. No evidence of rash.

## 2024-10-04 NOTE — PROGRESS NOTE ADULT - PROBLEM SELECTOR PLAN 1
Virtual Visit Details    Type of service:  Video Visit   Video Start Time:  1:10pm  Video End Time: 1:40pm    Originating Location (pt. Location): Home  Distant Location (provider location):  Off-site  Platform used for Video Visit: Trini     HbA1C 6.5  acceptable  will continue to monitor as outpatient  continue finger sticks with short acting insulin sliding scale 14-Jan-2017 22:21